# Patient Record
Sex: MALE | Race: NATIVE HAWAIIAN OR OTHER PACIFIC ISLANDER | NOT HISPANIC OR LATINO | URBAN - NONMETROPOLITAN AREA
[De-identification: names, ages, dates, MRNs, and addresses within clinical notes are randomized per-mention and may not be internally consistent; named-entity substitution may affect disease eponyms.]

---

## 2017-04-28 ENCOUNTER — APPOINTMENT (OUTPATIENT)
Age: 72
Setting detail: DERMATOLOGY
End: 2017-04-28

## 2017-04-28 DIAGNOSIS — L57.8 OTHER SKIN CHANGES DUE TO CHRONIC EXPOSURE TO NONIONIZING RADIATION: ICD-10-CM

## 2017-04-28 DIAGNOSIS — D485 NEOPLASM OF UNCERTAIN BEHAVIOR OF SKIN: ICD-10-CM

## 2017-04-28 PROBLEM — D48.5 NEOPLASM OF UNCERTAIN BEHAVIOR OF SKIN: Status: ACTIVE | Noted: 2017-04-28

## 2017-04-28 PROCEDURE — 11100: CPT

## 2017-04-28 PROCEDURE — 99214 OFFICE O/P EST MOD 30 MIN: CPT | Mod: 25

## 2017-04-28 PROCEDURE — OTHER COUNSELING: OTHER

## 2017-04-28 PROCEDURE — 11101: CPT

## 2017-04-28 PROCEDURE — OTHER MIPS QUALITY: OTHER

## 2017-04-28 PROCEDURE — OTHER BIOPSY BY SHAVE METHOD: OTHER

## 2017-04-28 ASSESSMENT — LOCATION SIMPLE DESCRIPTION DERM
LOCATION SIMPLE: LEFT CLAVICULAR SKIN
LOCATION SIMPLE: LEFT FOREHEAD
LOCATION SIMPLE: SCALP
LOCATION SIMPLE: POSTERIOR NECK

## 2017-04-28 ASSESSMENT — LOCATION DETAILED DESCRIPTION DERM
LOCATION DETAILED: LEFT CLAVICULAR SKIN
LOCATION DETAILED: RIGHT MEDIAL TRAPEZIAL NECK
LOCATION DETAILED: LEFT SUPERIOR FOREHEAD
LOCATION DETAILED: LEFT SUPERIOR PARIETAL SCALP

## 2017-04-28 ASSESSMENT — LOCATION ZONE DERM
LOCATION ZONE: NECK
LOCATION ZONE: SCALP
LOCATION ZONE: TRUNK
LOCATION ZONE: FACE

## 2017-04-28 NOTE — PROCEDURE: BIOPSY BY SHAVE METHOD
Billing Type: Third-Party Bill
Bill [id number] For Specimen Handling/Conveyance To Laboratory?: no
Wound Care: Bacitracin
Post-Care Instructions: I reviewed with the patient in detail post-care instructions. Patient is to keep the biopsy site dry overnight, and then apply bacitracin twice daily until healed. Patient may apply hydrogen peroxide soaks to remove any crusting.
Anesthesia Type: 1% lidocaine with epinephrine
Detail Level: Detailed
Silver Nitrate Text: The wound bed was treated with silver nitrate after the biopsy was performed.
Curettage Text: The wound bed was treated with curettage after the biopsy was performed.
Hemostasis: Drysol
Consent: Written consent was obtained and risks were reviewed including but not limited to scarring, infection, bleeding, scabbing, incomplete removal, nerve damage and allergy to anesthesia.
Notification Instructions: Patient will be notified of biopsy results. However, patient instructed to call the office if not contacted within 2 weeks.
Electrodesiccation Text: The wound bed was treated with electrodesiccation after the biopsy was performed.
X Size Of Lesion In Cm: 0
Electrodesiccation And Curettage Text: The wound bed was treated with electrodesiccation and curettage after the biopsy was performed.
Biopsy Type: H and E
Cryotherapy Text: The wound bed was treated with cryotherapy after the biopsy was performed.
Anesthesia Volume In Cc: 0.5
Biopsy Method: Double edge Personna blades
Dressing: bandage
Type Of Destruction Used: Curettage

## 2017-04-28 NOTE — PROCEDURE: MIPS QUALITY
Detail Level: Detailed
Quality 110: Preventive Care And Screening: Influenza Immunization: Influenza Immunization Administered during Influenza season
Quality 111:Pneumonia Vaccination Status For Older Adults: Pneumococcal Vaccination Previously Received
Quality 47: Advance Care Plan: Advance Care Planning discussed and documented; advance care plan or surrogate decision maker documented in the medical record.

## 2018-08-08 ENCOUNTER — APPOINTMENT (OUTPATIENT)
Age: 73
Setting detail: DERMATOLOGY
End: 2018-08-08

## 2018-08-08 DIAGNOSIS — D485 NEOPLASM OF UNCERTAIN BEHAVIOR OF SKIN: ICD-10-CM

## 2018-08-08 DIAGNOSIS — L57.8 OTHER SKIN CHANGES DUE TO CHRONIC EXPOSURE TO NONIONIZING RADIATION: ICD-10-CM

## 2018-08-08 DIAGNOSIS — Z85.828 PERSONAL HISTORY OF OTHER MALIGNANT NEOPLASM OF SKIN: ICD-10-CM

## 2018-08-08 DIAGNOSIS — L82.1 OTHER SEBORRHEIC KERATOSIS: ICD-10-CM

## 2018-08-08 DIAGNOSIS — B07.8 OTHER VIRAL WARTS: ICD-10-CM

## 2018-08-08 PROBLEM — M12.9 ARTHROPATHY, UNSPECIFIED: Status: ACTIVE | Noted: 2018-08-08

## 2018-08-08 PROBLEM — I10 ESSENTIAL (PRIMARY) HYPERTENSION: Status: ACTIVE | Noted: 2018-08-08

## 2018-08-08 PROBLEM — D48.5 NEOPLASM OF UNCERTAIN BEHAVIOR OF SKIN: Status: ACTIVE | Noted: 2018-08-08

## 2018-08-08 PROBLEM — E78.5 HYPERLIPIDEMIA, UNSPECIFIED: Status: ACTIVE | Noted: 2018-08-08

## 2018-08-08 PROCEDURE — 99213 OFFICE O/P EST LOW 20 MIN: CPT | Mod: 25

## 2018-08-08 PROCEDURE — 17110 DESTRUCT B9 LESION 1-14: CPT

## 2018-08-08 PROCEDURE — OTHER COUNSELING: OTHER

## 2018-08-08 PROCEDURE — 11101: CPT

## 2018-08-08 PROCEDURE — OTHER FOLLOW UP FOR NEXT VISIT: OTHER

## 2018-08-08 PROCEDURE — 11100: CPT | Mod: 59

## 2018-08-08 PROCEDURE — OTHER BIOPSY BY SHAVE METHOD: OTHER

## 2018-08-08 PROCEDURE — OTHER LIQUID NITROGEN: OTHER

## 2018-08-08 ASSESSMENT — LOCATION DETAILED DESCRIPTION DERM
LOCATION DETAILED: LEFT MEDIAL FRONTAL SCALP
LOCATION DETAILED: LEFT CLAVICULAR SKIN
LOCATION DETAILED: LEFT INFERIOR LATERAL NECK
LOCATION DETAILED: RIGHT SUPERIOR LATERAL UPPER BACK
LOCATION DETAILED: RIGHT CLAVICULAR NECK
LOCATION DETAILED: RIGHT INFERIOR ANTERIOR NECK
LOCATION DETAILED: LEFT LATERAL UPPER BACK
LOCATION DETAILED: RIGHT SUPERIOR ANTERIOR NECK
LOCATION DETAILED: LEFT FOREHEAD
LOCATION DETAILED: STERNUM
LOCATION DETAILED: RIGHT SUPERIOR FOREHEAD
LOCATION DETAILED: LEFT CLAVICULAR NECK
LOCATION DETAILED: MID-OCCIPITAL SCALP
LOCATION DETAILED: LEFT MEDIAL SUPERIOR CHEST

## 2018-08-08 ASSESSMENT — LOCATION ZONE DERM
LOCATION ZONE: SCALP
LOCATION ZONE: TRUNK
LOCATION ZONE: NECK
LOCATION ZONE: FACE

## 2018-08-08 ASSESSMENT — LOCATION SIMPLE DESCRIPTION DERM
LOCATION SIMPLE: LEFT FOREHEAD
LOCATION SIMPLE: LEFT ANTERIOR NECK
LOCATION SIMPLE: RIGHT UPPER BACK
LOCATION SIMPLE: CHEST
LOCATION SIMPLE: LEFT CLAVICULAR SKIN
LOCATION SIMPLE: LEFT UPPER BACK
LOCATION SIMPLE: RIGHT FOREHEAD
LOCATION SIMPLE: RIGHT ANTERIOR NECK
LOCATION SIMPLE: LEFT SCALP
LOCATION SIMPLE: POSTERIOR SCALP

## 2018-08-08 NOTE — PROCEDURE: BIOPSY BY SHAVE METHOD
Render Post-Care Instructions In Note?: no
Wound Care: Bacitracin
Post-Care Instructions: I reviewed with the patient in detail post-care instructions. Patient is to keep the biopsy site dry overnight, and then apply bacitracin twice daily until healed. Patient may apply hydrogen peroxide soaks to remove any crusting.
Notification Instructions: Patient will be notified of biopsy results. However, patient instructed to call the office if not contacted within 2 weeks.
Additional Anesthesia Volume In Cc (Will Not Render If 0): 0
Was A Bandage Applied: Yes
Biopsy Method: Acu-Razor
Electrodesiccation Text: The wound bed was treated with electrodesiccation after the biopsy was performed.
Type Of Destruction Used: Curettage
Consent: Written consent was obtained and risks were reviewed including but not limited to scarring, infection, bleeding, scabbing, incomplete removal, nerve damage and allergy to anesthesia.
Size Of Lesion In Cm: 0.6
Silver Nitrate Text: The wound bed was treated with silver nitrate after the biopsy was performed.
Hemostasis: Drysol
Dressing: bandage
Billing Type: Third-Party Bill
Electrodesiccation And Curettage Text: The wound bed was treated with electrodesiccation and curettage after the biopsy was performed.
Depth Of Biopsy: dermis
Anesthesia Volume In Cc: 0.5
Cryotherapy Text: The wound bed was treated with cryotherapy after the biopsy was performed.
Biopsy Type: H and E
Curettage Text: The wound bed was treated with curettage after the biopsy was performed.
Detail Level: Detailed

## 2018-08-08 NOTE — PROCEDURE: FOLLOW UP FOR NEXT VISIT
Detail Level: Simple
Instructions (Optional): Path
Scheduled For Follow Up In (Optional): PRN
Instructions (Optional): FBSE
Scheduled For Follow Up In (Optional): 2 weeks
Scheduled For Follow Up In (Optional): 6 months

## 2018-08-08 NOTE — PROCEDURE: LIQUID NITROGEN
Consent: The patient's consent was obtained including but not limited to risks of crusting, scabbing, blistering, scarring, darker or lighter pigmentary change, recurrence, incomplete removal and infection.
Include Z78.9 (Other Specified Conditions Influencing Health Status) As An Associated Diagnosis?: No
Medical Necessity Information: It is in your best interest to select a reason for this procedure from the list below. All of these items fulfill various CMS LCD requirements except the new and changing color options.
Post-Care Instructions: I reviewed with the patient in detail post-care instructions. Patient is to wear sunprotection, and avoid picking at any of the treated lesions. Pt may apply Vaseline to crusted or scabbing areas.
Detail Level: Detailed
Medical Necessity Clause: This procedure was medically necessary because the lesions that were treated were: viral infection
Number Of Freeze-Thaw Cycles: 2 freeze-thaw cycles

## 2018-09-19 ENCOUNTER — APPOINTMENT (OUTPATIENT)
Age: 73
Setting detail: DERMATOLOGY
End: 2018-09-21

## 2018-09-19 PROBLEM — C44.42 SQUAMOUS CELL CARCINOMA OF SKIN OF SCALP AND NECK: Status: ACTIVE | Noted: 2018-09-19

## 2018-09-19 PROBLEM — C44.329 SQUAMOUS CELL CARCINOMA OF SKIN OF OTHER PARTS OF FACE: Status: ACTIVE | Noted: 2018-09-19

## 2018-09-19 PROCEDURE — 99214 OFFICE O/P EST MOD 30 MIN: CPT | Mod: 25

## 2018-09-19 PROCEDURE — 77300 RADIATION THERAPY DOSE PLAN: CPT

## 2018-09-19 PROCEDURE — 77334 RADIATION TREATMENT AID(S): CPT

## 2018-09-19 PROCEDURE — OTHER FOLLOW UP FOR NEXT VISIT: OTHER

## 2018-09-19 PROCEDURE — 77263 THER RADIOLOGY TX PLNG CPLX: CPT

## 2018-09-19 PROCEDURE — OTHER SUPERFICIAL RADIATION TREATMENT: OTHER

## 2018-09-19 PROCEDURE — 77290 THER RAD SIMULAJ FIELD CPLX: CPT

## 2018-09-19 PROCEDURE — OTHER TREATMENT REGIMEN: OTHER

## 2018-09-19 NOTE — PROCEDURE: SUPERFICIAL RADIATION TREATMENT
Treatment Margins In Cm: 0.5
Intro Statement (Will Not Render If Left Blank): The patient is undergoing superficial radiation therapy for skin cancer and presents for weekly evaluation and management.  Per protocol and as documented on the flow sheet, the patient was questioned as to subjective redness, pruritus, pain, drainage, fatigue, or any other symptoms.  Objectively, the radiation area was evaluated with regards to erythema, atrophy, scale, crusting, erosion, ulceration, edema, purpura, tenderness, warmth, drainage, and any other findings.  The plan was extensively reviewed including the dose, and dosing schedule.  The simulation and clinical setup was also reviewed as was the external and any internal shields and based on this review the appropriateness and sufficiency of treatment was determined.
Field Size (Applicator): 2.5 cm
Fractions / Week Rx 2: 2
Daily Fractionated Dose (Optional- Include Units): 381.14cGy
Render Text From Evaluation And Management Tab (Will Not Bill [id number]): No
Dose / Tx In Cgy (Optional): 381.14 cGy
Bill For Dosimetry/Render Treatment Time Calculation In Note: Yes
Simple Simulation Afterword Text Will Be Included With Simple Simulations (Indications............): The patient had a complete consultation regarding all applicable modalities for the treatment of their skin cancer and based on a variety of factors including the type of tumor, size, and location, the relevant medical history as well as local tissue factors, the functional status of the individual, the ability to perform necessary postoperative wound instructions and the need for simultaneous treatments as well as overall wound healing status, it was determined that the patient would begin radiation therapy treatment for skin cancer.  A full simulation and treatment device design was performed including the determination and formulation of appropriate simple and complex devices including lead shield of 0.762 mm thickness to form molded customized shielding to specifically correlate with the lesion size including treatment margin.  The custom lead shield is adequate to accommodate the appropriate applicator and provide adequate shielding around the treatment site.  The specific field applicator, shields, and devices both simple and complex as well as the specific patient setup is outlined below.  The patient was given a full consent for superficial radiation to both verbally and in writing and the full determination of patient's eligibility for treatment and selection is outlined on the patient eligibility and treatment selection form.  The specific superficial radiotherapy prescription was determined and was documented on the superficial radiotherapy prescription form.  A treatment calculation was also performed and documented on the treatment calculation form.  Based on the prescription, the patient was scheduled for a series of fractional treatments.
Total Dose (Optional-Please Include Units) Rx 2: 3068.0cGy
Energy (Include Units): 70Kv
Dose Per Fractionation In Cgy (Optional): 381.14
Energy (Optional-Please Include Units) Rx 2: 50Kv
Treatment Time / Fractionation (Optional- Include Units): 0.59Min
Fractions / Week Rx 4: 5
Custom Shielding Preamble Text Will Not Be Included With Simple Simulations (.......... X X Y Cm): A lead shield of 0.762 mm thickness is utilized to form a molded, custom shield with a
Total Number Of Fractions Rx 4: 15
Field Number: 3
Treatment Device Design After Initial Simulation Justification (Will Render If Bill For Treatment Devices = Yes): The patient is status post radiation simulation and is evaluated as to the use of additional devices for shielding and placement for radiation therapy.
Number Of Days Off Treatment: 1
Custom Shielding Afterword Text Will Not Be Included With Simple Simulations (X X Y Cm............): port to correlate with the lesion size, including treatment margin. The custom lead shield is adequate to accommodate the appropriate applicator and provide adequate shielding around the treatment site. Additional shielding (as noted below) is used to protect sensitive, normal tissues.
Computed Treatment Time In Min (Will Render The Same As Calculated Treatment Time If Left Blank): 0.59
Total Number Of Fractions Rx 2: 8
Assessment: Appropriate reaction
Functional Status: 1 (ambulatory, light activity)
Detail Level: Detailed
Patient Positioning: Sitting
Pathology Override (Pathology Will Render As Diagnosis Name If Left Blank): Superficially Invasive Squamous Cell Carcinoma
Treatment Time / Fractionation (Optional- Include Units) Rx 2: 0.50min
Total Dose (Optional-Please Include Units): 1905.7cGy
Shielding Size (Optional- Include Units): 2.0cm x 2.0cm
Time Dose Fractionation (Optional- Include Units If Applicable): 38
Shielding Size (Optional- Include Units) Rx 2: 2.0 x 2.0 cm
Fractionation Number: 0
Daily Fractionated Dose (Optional- Include Units) Rx 2: 383.5cGy
Time Dose Fractionation (Optional- Include Units If Applicable) Rx 2: 61
Simple Simulation Preamble Text Will Be Included With Simple Simulations (.......... Indications): Simple simulation was performed today for the following reasons:
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

## 2018-09-19 NOTE — PROCEDURE: TREATMENT REGIMEN
Plan: Per the request of Dr. Childress, patient was seen today for Superficial Radiation Therapy requiring simulation (CPT® 19637) in preparation for treatment of specific diseased site(s). Simulation is necessary to determine correct patient and treatment portal positioning, deliver safe and effective radiation therapy. Today’s setup was evaluated determining continuation of treatment with the current plan, or necessary changes as appropriate. All appropriate custom blocking and treatment parameters verified by the Radiation Therapist\\nToday’s visit is for Simulation and planning for radiation therapy.  Question were answered and concerns were address.  Patient was evaluated based on listed criteria and is a suitable candidate to begin SRT.  \\n
Detail Level: Zone

## 2018-10-29 ENCOUNTER — APPOINTMENT (OUTPATIENT)
Age: 73
Setting detail: DERMATOLOGY
End: 2018-10-29

## 2018-10-29 PROBLEM — C44.42 SQUAMOUS CELL CARCINOMA OF SKIN OF SCALP AND NECK: Status: ACTIVE | Noted: 2018-10-29

## 2018-10-29 PROBLEM — C44.329 SQUAMOUS CELL CARCINOMA OF SKIN OF OTHER PARTS OF FACE: Status: ACTIVE | Noted: 2018-10-29

## 2018-10-29 PROCEDURE — OTHER FOLLOW UP FOR NEXT VISIT: OTHER

## 2018-10-29 PROCEDURE — G6001 ECHO GUIDANCE RADIOTHERAPY: HCPCS

## 2018-10-29 PROCEDURE — OTHER SUPERFICIAL RADIATION TREATMENT: OTHER

## 2018-10-29 PROCEDURE — 77401 RADIATION TX DELIVERY SUPFC: CPT

## 2018-10-29 PROCEDURE — OTHER TREATMENT REGIMEN: OTHER

## 2018-10-29 PROCEDURE — 77280 THER RAD SIMULAJ FIELD SMPL: CPT

## 2018-10-29 NOTE — PROCEDURE: TREATMENT REGIMEN
Plan: This patient has been treated today with superficial radiation therapy for non-melanoma skin cancer.   \\n \\nWritten informed consent has been previously obtained from this patient for this treatment.  This consent is documented in the patient’s chart.  The patient gave verbal consent to continue treatment today.\\n \\nThe patient was treated with a specific radiation dose and setup as prescribed by the provider listed on this visit note.  A Radiation Therapist performed administration of radiation. The treatment parameters and cumulative dose are indicated above. \\n \\nPrior to administering the radiation, the patient underwent a verification simulation defining relevant normal and abnormal target anatomy and acquiring images and data necessary to develop optimal radiation treatment process for the patient. This process includes verification of the treatment port(s) and proper treatment positioning.  All treatment ports were photographed.  The patient’s customized lead blocking was confirmed.   Considering, superficial radiotherapy setup is a clinical setup, this requires physician and radiation therapist to clarify location interest being treated against initial images, pathology and patient anatomy. Care was taken ensuring fields treated were geometrically accurate and properly positioned using daily verification simulation.  This process is also utilized to determine if any changes are necessary.   These steps are therefore medically necessary ensuring safe and effective administration of radiation.\\n \\nA high frequency ultrasound image was acquired today for two-dimensional evaluation of the tumor volume and response to treatment, in addition to geometric accuracy of field placement. The daily field placement is separate and distinct from the initial simulation and is an important task in providing safe administration of radiation therapy. Physician evaluation of the ultrasound tumor depth will be ongoing throughout the course of treatment and is deemed medically necessary in order to ensure the efficacy of treatment. Today’s image was evaluated for determination of continuation of treatment with the current plan or with necessary changes as appropriate. Additionally, the use of visualization and targeted assessment allows the patient to be able to see their cancer(s) progress, encouraging patient to complete full course of radiotherapy. \\n \\nPer Dr. Childress, continued daily US guidance and clinical setup simulation is required for field placement, measurement of tumor depth, progress and acute effect monitoring.\\n\\nL. Central Forehead\\nUs depth is 0.83mm, Repop +++, MAXIMUS equivocal\\n\\nL. Medial Frontal Scalp\\nUS depth is 0.95mm, Repop +++, MAXIMUS equivocal\\n\\nMid- Occipital Scalp\\nUS depth is 0.8mm, Repop +++, MAXIMUS equivocal
Detail Level: Zone

## 2018-10-29 NOTE — PROCEDURE: SUPERFICIAL RADIATION TREATMENT
Number Of Days Off Treatment: 1
Fractions / Week Rx 4: 5
Total Number Of Fractions Rx 2: 8
Port Dimensions-Y Axis In Cm: 2
Patient Positioning: Sitting
Field Size (Applicator): 2.5 cm
Total Number Of Fractions Rx 4: 15
Time Dose Fractionation (Optional- Include Units If Applicable) Rx 2: 61
Patel For Simulation Without Treatment Device Design (Simple Simulation): No
Cumulative Dose In Cgy (Optional): 381.14
Pathology Override (Pathology Will Render As Diagnosis Name If Left Blank): Superficially Invasive Squamous Cell Carcinoma
Intro Statement (Will Not Render If Left Blank): The patient is undergoing superficial radiation therapy for skin cancer and presents for weekly evaluation and management.  Per protocol and as documented on the flow sheet, the patient was questioned as to subjective redness, pruritus, pain, drainage, fatigue, or any other symptoms.  Objectively, the radiation area was evaluated with regards to erythema, atrophy, scale, crusting, erosion, ulceration, edema, purpura, tenderness, warmth, drainage, and any other findings.  The plan was extensively reviewed including the dose, and dosing schedule.  The simulation and clinical setup was also reviewed as was the external and any internal shields and based on this review the appropriateness and sufficiency of treatment was determined.
Treatment Time / Fractionation (Optional- Include Units) Rx 2: 0.50min
Treatment Time In Min (Optional): 0.59
Custom Shielding Preamble Text Will Not Be Included With Simple Simulations (.......... X X Y Cm): A lead shield of 0.762 mm thickness is utilized to form a molded, custom shield with a
Total Dose (Optional-Please Include Units) Rx 2: 3068.0cGy
Simple Simulation Afterword Text Will Be Included With Simple Simulations (Indications............): The patient had a complete consultation regarding all applicable modalities for the treatment of their skin cancer and based on a variety of factors including the type of tumor, size, and location, the relevant medical history as well as local tissue factors, the functional status of the individual, the ability to perform necessary postoperative wound instructions and the need for simultaneous treatments as well as overall wound healing status, it was determined that the patient would begin radiation therapy treatment for skin cancer.  A full simulation and treatment device design was performed including the determination and formulation of appropriate simple and complex devices including lead shield of 0.762 mm thickness to form molded customized shielding to specifically correlate with the lesion size including treatment margin.  The custom lead shield is adequate to accommodate the appropriate applicator and provide adequate shielding around the treatment site.  The specific field applicator, shields, and devices both simple and complex as well as the specific patient setup is outlined below.  The patient was given a full consent for superficial radiation to both verbally and in writing and the full determination of patient's eligibility for treatment and selection is outlined on the patient eligibility and treatment selection form.  The specific superficial radiotherapy prescription was determined and was documented on the superficial radiotherapy prescription form.  A treatment calculation was also performed and documented on the treatment calculation form.  Based on the prescription, the patient was scheduled for a series of fractional treatments.
Daily Fractionated Dose (Optional- Include Units): 381.14cGy
Time Dose Fractionation (Optional- Include Units If Applicable): 38
Energy (Optional-Please Include Units): 70kV
Shielding Size (Optional- Include Units) Rx 2: 2.0 x 2.0 cm
Treatment Time / Fractionation (Optional- Include Units): 0.59Min
Treatment Device Design After Initial Simulation Justification (Will Render If Bill For Treatment Devices = Yes): The patient is status post radiation simulation and is evaluated as to the use of additional devices for shielding and placement for radiation therapy.
Daily Fractionated Dose (Optional- Include Units) Rx 2: 383.5cGy
Total Dose (Optional-Please Include Units): 1905.7cGy
Treatment Margins In Cm: 0.5
Simple Simulation Preamble Text Will Be Included With Simple Simulations (.......... Indications): Simple simulation was performed today for the following reasons:
Assessment: Appropriate reaction
Functional Status: 1 (ambulatory, light activity)
Day Of The Week Treatment Administered: Monday
Custom Shielding Afterword Text Will Not Be Included With Simple Simulations (X X Y Cm............): port to correlate with the lesion size, including treatment margin. The custom lead shield is adequate to accommodate the appropriate applicator and provide adequate shielding around the treatment site. Additional shielding (as noted below) is used to protect sensitive, normal tissues.
Energy (Optional-Please Include Units) Rx 2: 50Kv
Bill For Radiation Treatment: Yes
Detail Level: Detailed
Field Number: 3
Shielding Size (Optional- Include Units): 2.0cm x 2.0cm
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

## 2018-10-31 ENCOUNTER — APPOINTMENT (OUTPATIENT)
Age: 73
Setting detail: DERMATOLOGY
End: 2018-11-01

## 2018-10-31 PROBLEM — C44.329 SQUAMOUS CELL CARCINOMA OF SKIN OF OTHER PARTS OF FACE: Status: ACTIVE | Noted: 2018-10-31

## 2018-10-31 PROBLEM — C44.42 SQUAMOUS CELL CARCINOMA OF SKIN OF SCALP AND NECK: Status: ACTIVE | Noted: 2018-10-31

## 2018-10-31 PROCEDURE — G6001 ECHO GUIDANCE RADIOTHERAPY: HCPCS

## 2018-10-31 PROCEDURE — 77280 THER RAD SIMULAJ FIELD SMPL: CPT

## 2018-10-31 PROCEDURE — OTHER FOLLOW UP FOR NEXT VISIT: OTHER

## 2018-10-31 PROCEDURE — OTHER SUPERFICIAL RADIATION TREATMENT: OTHER

## 2018-10-31 PROCEDURE — OTHER TREATMENT REGIMEN: OTHER

## 2018-10-31 PROCEDURE — 77401 RADIATION TX DELIVERY SUPFC: CPT

## 2018-10-31 NOTE — PROCEDURE: TREATMENT REGIMEN
Plan: This patient has been treated today with superficial radiation therapy for non-melanoma skin cancer.   \\n \\nWritten informed consent has been previously obtained from this patient for this treatment.  This consent is documented in the patient’s chart.  The patient gave verbal consent to continue treatment today.\\n \\nThe patient was treated with a specific radiation dose and setup as prescribed by the provider listed on this visit note.  A Radiation Therapist performed administration of radiation. The treatment parameters and cumulative dose are indicated above. \\n \\nPrior to administering the radiation, the patient underwent a verification simulation defining relevant normal and abnormal target anatomy and acquiring images and data necessary to develop optimal radiation treatment process for the patient. This process includes verification of the treatment port(s) and proper treatment positioning.  All treatment ports were photographed.  The patient’s customized lead blocking was confirmed.   Considering, superficial radiotherapy setup is a clinical setup, this requires physician and radiation therapist to clarify location interest being treated against initial images, pathology and patient anatomy. Care was taken ensuring fields treated were geometrically accurate and properly positioned using daily verification simulation.  This process is also utilized to determine if any changes are necessary.   These steps are therefore medically necessary ensuring safe and effective administration of radiation.\\n \\nA high frequency ultrasound image was acquired today for two-dimensional evaluation of the tumor volume and response to treatment, in addition to geometric accuracy of field placement. The daily field placement is separate and distinct from the initial simulation and is an important task in providing safe administration of radiation therapy. Physician evaluation of the ultrasound tumor depth will be ongoing throughout the course of treatment and is deemed medically necessary in order to ensure the efficacy of treatment. Today’s image was evaluated for determination of continuation of treatment with the current plan or with necessary changes as appropriate. Additionally, the use of visualization and targeted assessment allows the patient to be able to see their cancer(s) progress, encouraging patient to complete full course of radiotherapy. \\n \\nPer Dr. Childress, continued daily US guidance and clinical setup simulation is required for field placement, measurement of tumor depth, progress and acute effect monitoring.\\n\\nL. Central Forehead\\nUs depth is 0.65mm, Repop +++, MAXIMUS equivocal\\n\\nL. Medial Frontal Scalp\\nUS depth is 1.09mm, Repop +++, MAXIMUS equivocal\\n\\nMid- Occipital Scalp\\nUS depth is 0.77mm, Repop +++, MAXIMUS equivocal
Detail Level: Zone

## 2018-10-31 NOTE — PROCEDURE: SUPERFICIAL RADIATION TREATMENT
Computed Treatment Time In Min (Will Render The Same As Calculated Treatment Time If Left Blank): 0.59
Bill For Treatment Devices Only: No
Fractionation Number (Evaluation): 5
Energy (Optional-Please Include Units): 70kV
Shielding Size (Optional- Include Units): 2.0cm x 2.0cm
Dose Per Fractionation In Cgy (Optional): 381.14
Total Number Of Fractions Rx 3: 15
Simple Simulation Preamble Text Will Be Included With Simple Simulations (.......... Indications): Simple simulation was performed today for the following reasons:
Treatment Margins In Cm: 0.5
Field Number: 1
Treatment Time / Fractionation (Optional- Include Units): 0.59Min
Custom Shielding Preamble Text Will Not Be Included With Simple Simulations (.......... X X Y Cm): A lead shield of 0.762 mm thickness is utilized to form a molded, custom shield with a
Field Size (Applicator): 2.5 cm
Initial Radiation Treatment Planning (Will Render If Bill Simulation = Yes): The patient had a complete consultation regarding all applicable modalities for the treatment of their skin cancer and based on a variety of factors including the type of tumor, size, and location, the relevant medical history as well as local tissue factors, the functional status of the individual, the ability to perform necessary postoperative wound instructions and the need for simultaneous treatments as well as overall wound healing status, it was determined that the patient would begin radiation therapy treatment for skin cancer.  A full simulation and treatment device design was performed including the determination and formulation of appropriate simple and complex devices including lead shield of 0.762 mm thickness to form molded customized shielding to specifically correlate with the lesion size including treatment margin.  The custom lead shield is adequate to accommodate the appropriate applicator and provide adequate shielding around the treatment site.  The specific field applicator, shields, and devices both simple and complex as well as the specific patient setup is outlined below.  The patient was given a full consent for superficial radiation to both verbally and in writing and the full determination of patient's eligibility for treatment and selection is outlined on the patient eligibility and treatment selection form.  The specific superficial radiotherapy prescription was determined and was documented on the superficial radiotherapy prescription form.  A treatment calculation was also performed and documented on the treatment calculation form.  Based on the prescription, the patient was scheduled for a series of fractional treatments.
Day Of The Week Treatment Administered: Wednesday
Total Dose (Optional-Please Include Units) Rx 2: 3068.0cGy
Patient Positioning: Sitting
Shielding Size (Optional- Include Units) Rx 2: 2.0 x 2.0 cm
Include Rx 2 When Rendering Additional Prescriptions: Yes
Pathology Override (Pathology Will Render As Diagnosis Name If Left Blank): Superficially Invasive Squamous Cell Carcinoma
Intro Statement (Will Not Render If Left Blank): The patient is undergoing superficial radiation therapy for skin cancer and presents for weekly evaluation and management.  Per protocol and as documented on the flow sheet, the patient was questioned as to subjective redness, pruritus, pain, drainage, fatigue, or any other symptoms.  Objectively, the radiation area was evaluated with regards to erythema, atrophy, scale, crusting, erosion, ulceration, edema, purpura, tenderness, warmth, drainage, and any other findings.  The plan was extensively reviewed including the dose, and dosing schedule.  The simulation and clinical setup was also reviewed as was the external and any internal shields and based on this review the appropriateness and sufficiency of treatment was determined.
Port Dimensions-X Axis In Cm: 2
Treatment Time / Fractionation (Optional- Include Units) Rx 2: 0.50min
Cumulative Dose In Cgy (Optional): 762.28
Assessment: Appropriate reaction
Total Number Of Fractions Rx 2: 8
Time Dose Fractionation (Optional- Include Units If Applicable): 38
Functional Status: 1 (ambulatory, light activity)
Time Dose Fractionation (Optional- Include Units If Applicable) Rx 2: 61
Daily Fractionated Dose (Optional- Include Units): 381.14cGy
Energy (Optional-Please Include Units) Rx 2: 50Kv
Custom Shielding Afterword Text Will Not Be Included With Simple Simulations (X X Y Cm............): port to correlate with the lesion size, including treatment margin. The custom lead shield is adequate to accommodate the appropriate applicator and provide adequate shielding around the treatment site. Additional shielding (as noted below) is used to protect sensitive, normal tissues.
Detail Level: Detailed
Treatment Device Design After Initial Simulation Justification (Will Render If Bill For Treatment Devices = Yes): The patient is status post radiation simulation and is evaluated as to the use of additional devices for shielding and placement for radiation therapy.
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
Total Dose (Optional-Please Include Units): 1905.7cGy
Daily Fractionated Dose (Optional- Include Units) Rx 2: 383.5cGy
Field Number: 3

## 2018-11-05 ENCOUNTER — APPOINTMENT (OUTPATIENT)
Age: 73
Setting detail: DERMATOLOGY
End: 2018-11-05

## 2018-11-05 PROBLEM — C44.329 SQUAMOUS CELL CARCINOMA OF SKIN OF OTHER PARTS OF FACE: Status: ACTIVE | Noted: 2018-11-05

## 2018-11-05 PROBLEM — C44.42 SQUAMOUS CELL CARCINOMA OF SKIN OF SCALP AND NECK: Status: ACTIVE | Noted: 2018-11-05

## 2018-11-05 PROBLEM — Z85.828 PERSONAL HISTORY OF OTHER MALIGNANT NEOPLASM OF SKIN: Status: ACTIVE | Noted: 2018-11-05

## 2018-11-05 PROCEDURE — OTHER SUPERFICIAL RADIATION TREATMENT: OTHER

## 2018-11-05 PROCEDURE — G6001 ECHO GUIDANCE RADIOTHERAPY: HCPCS

## 2018-11-05 PROCEDURE — OTHER FOLLOW UP FOR NEXT VISIT: OTHER

## 2018-11-05 PROCEDURE — OTHER TREATMENT REGIMEN: OTHER

## 2018-11-05 PROCEDURE — 77280 THER RAD SIMULAJ FIELD SMPL: CPT

## 2018-11-05 PROCEDURE — 77401 RADIATION TX DELIVERY SUPFC: CPT

## 2018-11-05 NOTE — PROCEDURE: TREATMENT REGIMEN
Plan: This patient has been treated today with superficial radiation therapy for non-melanoma skin cancer.   \\n \\nWritten informed consent has been previously obtained from this patient for this treatment.  This consent is documented in the patient’s chart.  The patient gave verbal consent to continue treatment today.\\n \\nThe patient was treated with a specific radiation dose and setup as prescribed by the provider listed on this visit note.  A Radiation Therapist performed administration of radiation. The treatment parameters and cumulative dose are indicated above. \\n \\nPrior to administering the radiation, the patient underwent a verification simulation defining relevant normal and abnormal target anatomy and acquiring images and data necessary to develop optimal radiation treatment process for the patient. This process includes verification of the treatment port(s) and proper treatment positioning.  All treatment ports were photographed.  The patient’s customized lead blocking was confirmed.   Considering, superficial radiotherapy setup is a clinical setup, this requires physician and radiation therapist to clarify location interest being treated against initial images, pathology and patient anatomy. Care was taken ensuring fields treated were geometrically accurate and properly positioned using daily verification simulation.  This process is also utilized to determine if any changes are necessary.   These steps are therefore medically necessary ensuring safe and effective administration of radiation.\\n \\nA high frequency ultrasound image was acquired today for two-dimensional evaluation of the tumor volume and response to treatment, in addition to geometric accuracy of field placement. The daily field placement is separate and distinct from the initial simulation and is an important task in providing safe administration of radiation therapy. Physician evaluation of the ultrasound tumor depth will be ongoing throughout the course of treatment and is deemed medically necessary in order to ensure the efficacy of treatment. Today’s image was evaluated for determination of continuation of treatment with the current plan or with necessary changes as appropriate. Additionally, the use of visualization and targeted assessment allows the patient to be able to see their cancer(s) progress, encouraging patient to complete full course of radiotherapy. \\n \\nPer Dr. Childress, continued daily US guidance and clinical setup simulation is required for field placement, measurement of tumor depth, progress and acute effect monitoring.\\n\\nL. Central Forehead\\nUs depth is 0.0mm, Repop +++, MAXIMUS 0.0 mm sq\\n\\nL. Medial Frontal Scalp\\nUS depth is 1.15mm, Repop +++, MAXIMUS equivocal\\n\\nMid- Occipital Scalp\\nUS depth is 0.64mm, Repop +++, MAXIMUS equivocal
Detail Level: Zone

## 2018-11-05 NOTE — PROCEDURE: SUPERFICIAL RADIATION TREATMENT
Field Size (Applicator): 2.5 cm
Time Dose Fractionation (Optional- Include Units If Applicable): 38
Port Dimensions-X Axis In Cm: 2
Fractionation Number: 3
Total Dose (Optional-Please Include Units) Rx 2: 3068.0cGy
Treatment Time / Fractionation (Optional- Include Units): 0.59Min
Treatment Time / Fractionation (Optional- Include Units) Rx 2: 0.50min
Shielding Size (Optional- Include Units) Rx 2: 2.0 x 2.0 cm
Simple Simulation Preamble Text Will Be Included With Simple Simulations (.......... Indications): Simple simulation was performed today for the following reasons:
Render Text From Evaluation And Management Tab (Will Not Bill 12701): No
Treatment Device Design After Initial Simulation Justification (Will Render If Bill For Treatment Devices = Yes): The patient is status post radiation simulation and is evaluated as to the use of additional devices for shielding and placement for radiation therapy.
Energy (Optional-Please Include Units): 70kV
Dose / Tx In Cgy (Optional): 381.14
Total Number Of Fractions Rx 2: 8
Initial Radiation Treatment Planning (Will Render If Bill Simulation = Yes): The patient had a complete consultation regarding all applicable modalities for the treatment of their skin cancer and based on a variety of factors including the type of tumor, size, and location, the relevant medical history as well as local tissue factors, the functional status of the individual, the ability to perform necessary postoperative wound instructions and the need for simultaneous treatments as well as overall wound healing status, it was determined that the patient would begin radiation therapy treatment for skin cancer.  A full simulation and treatment device design was performed including the determination and formulation of appropriate simple and complex devices including lead shield of 0.762 mm thickness to form molded customized shielding to specifically correlate with the lesion size including treatment margin.  The custom lead shield is adequate to accommodate the appropriate applicator and provide adequate shielding around the treatment site.  The specific field applicator, shields, and devices both simple and complex as well as the specific patient setup is outlined below.  The patient was given a full consent for superficial radiation to both verbally and in writing and the full determination of patient's eligibility for treatment and selection is outlined on the patient eligibility and treatment selection form.  The specific superficial radiotherapy prescription was determined and was documented on the superficial radiotherapy prescription form.  A treatment calculation was also performed and documented on the treatment calculation form.  Based on the prescription, the patient was scheduled for a series of fractional treatments.
Patient Positioning: Sitting
Fractions / Week Rx 4: 5
Intro Statement (Will Not Render If Left Blank): The patient is undergoing superficial radiation therapy for skin cancer and presents for weekly evaluation and management.  Per protocol and as documented on the flow sheet, the patient was questioned as to subjective redness, pruritus, pain, drainage, fatigue, or any other symptoms.  Objectively, the radiation area was evaluated with regards to erythema, atrophy, scale, crusting, erosion, ulceration, edema, purpura, tenderness, warmth, drainage, and any other findings.  The plan was extensively reviewed including the dose, and dosing schedule.  The simulation and clinical setup was also reviewed as was the external and any internal shields and based on this review the appropriateness and sufficiency of treatment was determined.
Number Of Treatment Days: 1
Functional Status: 1 (ambulatory, light activity)
Assessment: Appropriate reaction
Custom Shielding Preamble Text Will Not Be Included With Simple Simulations (.......... X X Y Cm): A lead shield of 0.762 mm thickness is utilized to form a molded, custom shield with a
Treatment Time In Min (Optional): 0.59
Include Rx 2 When Rendering Additional Prescriptions: Yes
Cumulative Dose In Cgy (Optional): 1143.42
Shielding Size (Optional- Include Units): 2.0cm x 2.0cm
Detail Level: Detailed
Day Of The Week Treatment Administered: Monday
Total Number Of Fractions Rx 3: 15
Energy (Optional-Please Include Units) Rx 2: 50Kv
Custom Shielding Afterword Text Will Not Be Included With Simple Simulations (X X Y Cm............): port to correlate with the lesion size, including treatment margin. The custom lead shield is adequate to accommodate the appropriate applicator and provide adequate shielding around the treatment site. Additional shielding (as noted below) is used to protect sensitive, normal tissues.
Daily Fractionated Dose (Optional- Include Units) Rx 2: 383.5cGy
Daily Fractionated Dose (Optional- Include Units): 381.14cGy
Total Dose (Optional-Please Include Units): 1905.7cGy
Time Dose Fractionation (Optional- Include Units If Applicable) Rx 2: 61
Treatment Margins In Cm: 0.5
Pathology Override (Pathology Will Render As Diagnosis Name If Left Blank): Superficially Invasive Squamous Cell Carcinoma
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

## 2018-11-07 ENCOUNTER — APPOINTMENT (OUTPATIENT)
Age: 73
Setting detail: DERMATOLOGY
End: 2018-11-12

## 2018-11-07 PROBLEM — C44.42 SQUAMOUS CELL CARCINOMA OF SKIN OF SCALP AND NECK: Status: ACTIVE | Noted: 2018-11-07

## 2018-11-07 PROBLEM — C44.329 SQUAMOUS CELL CARCINOMA OF SKIN OF OTHER PARTS OF FACE: Status: ACTIVE | Noted: 2018-11-07

## 2018-11-07 PROCEDURE — 77280 THER RAD SIMULAJ FIELD SMPL: CPT

## 2018-11-07 PROCEDURE — OTHER FOLLOW UP FOR NEXT VISIT: OTHER

## 2018-11-07 PROCEDURE — OTHER SUPERFICIAL RADIATION TREATMENT: OTHER

## 2018-11-07 PROCEDURE — OTHER TREATMENT REGIMEN: OTHER

## 2018-11-07 PROCEDURE — 77401 RADIATION TX DELIVERY SUPFC: CPT

## 2018-11-07 PROCEDURE — G6001 ECHO GUIDANCE RADIOTHERAPY: HCPCS

## 2018-11-07 NOTE — PROCEDURE: SUPERFICIAL RADIATION TREATMENT
Energy (Optional-Please Include Units) Rx 2: 50Kv
Total Dose (Optional-Please Include Units): 1905.7cGy
Bill For Treatment Devices Only: No
Port Dimensions-X Axis In Cm: 2
Day Of The Week Treatment Administered: Wednesday
Simple Simulation Afterword Text Will Be Included With Simple Simulations (Indications............): The patient had a complete consultation regarding all applicable modalities for the treatment of their skin cancer and based on a variety of factors including the type of tumor, size, and location, the relevant medical history as well as local tissue factors, the functional status of the individual, the ability to perform necessary postoperative wound instructions and the need for simultaneous treatments as well as overall wound healing status, it was determined that the patient would begin radiation therapy treatment for skin cancer.  A full simulation and treatment device design was performed including the determination and formulation of appropriate simple and complex devices including lead shield of 0.762 mm thickness to form molded customized shielding to specifically correlate with the lesion size including treatment margin.  The custom lead shield is adequate to accommodate the appropriate applicator and provide adequate shielding around the treatment site.  The specific field applicator, shields, and devices both simple and complex as well as the specific patient setup is outlined below.  The patient was given a full consent for superficial radiation to both verbally and in writing and the full determination of patient's eligibility for treatment and selection is outlined on the patient eligibility and treatment selection form.  The specific superficial radiotherapy prescription was determined and was documented on the superficial radiotherapy prescription form.  A treatment calculation was also performed and documented on the treatment calculation form.  Based on the prescription, the patient was scheduled for a series of fractional treatments.
Include Rx 2 When Rendering Additional Prescriptions: Yes
Dose Per Fractionation In Cgy (Optional): 381.14
Simple Simulation Preamble Text Will Be Included With Simple Simulations (.......... Indications): Simple simulation was performed today for the following reasons:
Treatment Time / Fractionation (Optional- Include Units): 0.59Min
Custom Shielding Preamble Text Will Not Be Included With Simple Simulations (.......... X X Y Cm): A lead shield of 0.762 mm thickness is utilized to form a molded, custom shield with a
Treatment Time / Fractionation (Optional- Include Units) Rx 2: 0.50min
Field Size (Applicator): 2.5 cm
Total Number Of Fractions Rx 4: 15
Daily Fractionated Dose (Optional- Include Units): 381.14cGy
Functional Status: 1 (ambulatory, light activity)
Fractions / Week Rx 4: 5
Treatment Margins In Cm: 0.5
Number Of Treatment Days: 1
Assessment: Appropriate reaction
Custom Shielding Afterword Text Will Not Be Included With Simple Simulations (X X Y Cm............): port to correlate with the lesion size, including treatment margin. The custom lead shield is adequate to accommodate the appropriate applicator and provide adequate shielding around the treatment site. Additional shielding (as noted below) is used to protect sensitive, normal tissues.
Total Dose (Optional-Please Include Units) Rx 2: 3068.0cGy
Treatment Device Design After Initial Simulation Justification (Will Render If Bill For Treatment Devices = Yes): The patient is status post radiation simulation and is evaluated as to the use of additional devices for shielding and placement for radiation therapy.
Shielding Size (Optional- Include Units) Rx 2: 2.0 x 2.0 cm
Energy (Optional-Please Include Units): 70kV
Daily Fractionated Dose (Optional- Include Units) Rx 2: 383.5cGy
Total Number Of Fractions Rx 2: 8
Time Dose Fractionation (Optional- Include Units If Applicable): 38
Computed Treatment Time In Min (Will Render The Same As Calculated Treatment Time If Left Blank): 0.59
Patient Positioning: Sitting
Fractionation Number: 4
Cumulative Dose In Cgy (Optional): 1524.56
Time Dose Fractionation (Optional- Include Units If Applicable) Rx 2: 61
Detail Level: Detailed
Field Number: 3
Shielding Size (Optional- Include Units): 2.0cm x 2.0cm
Intro Statement (Will Not Render If Left Blank): The patient is undergoing superficial radiation therapy for skin cancer and presents for weekly evaluation and management.  Per protocol and as documented on the flow sheet, the patient was questioned as to subjective redness, pruritus, pain, drainage, fatigue, or any other symptoms.  Objectively, the radiation area was evaluated with regards to erythema, atrophy, scale, crusting, erosion, ulceration, edema, purpura, tenderness, warmth, drainage, and any other findings.  The plan was extensively reviewed including the dose, and dosing schedule.  The simulation and clinical setup was also reviewed as was the external and any internal shields and based on this review the appropriateness and sufficiency of treatment was determined.
Pathology Override (Pathology Will Render As Diagnosis Name If Left Blank): Superficially Invasive Squamous Cell Carcinoma
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

## 2018-11-07 NOTE — PROCEDURE: TREATMENT REGIMEN
Plan: This patient has been treated today with superficial radiation therapy for non-melanoma skin cancer.   \\n \\nWritten informed consent has been previously obtained from this patient for this treatment.  This consent is documented in the patient’s chart.  The patient gave verbal consent to continue treatment today.\\n \\nThe patient was treated with a specific radiation dose and setup as prescribed by the provider listed on this visit note.  A Radiation Therapist performed administration of radiation. The treatment parameters and cumulative dose are indicated above. \\n \\nPrior to administering the radiation, the patient underwent a verification simulation defining relevant normal and abnormal target anatomy and acquiring images and data necessary to develop optimal radiation treatment process for the patient. This process includes verification of the treatment port(s) and proper treatment positioning.  All treatment ports were photographed.  The patient’s customized lead blocking was confirmed.   Considering, superficial radiotherapy setup is a clinical setup, this requires physician and radiation therapist to clarify location interest being treated against initial images, pathology and patient anatomy. Care was taken ensuring fields treated were geometrically accurate and properly positioned using daily verification simulation.  This process is also utilized to determine if any changes are necessary.   These steps are therefore medically necessary ensuring safe and effective administration of radiation.\\n \\nA high frequency ultrasound image was acquired today for two-dimensional evaluation of the tumor volume and response to treatment, in addition to geometric accuracy of field placement. The daily field placement is separate and distinct from the initial simulation and is an important task in providing safe administration of radiation therapy. Physician evaluation of the ultrasound tumor depth will be ongoing throughout the course of treatment and is deemed medically necessary in order to ensure the efficacy of treatment. Today’s image was evaluated for determination of continuation of treatment with the current plan or with necessary changes as appropriate. Additionally, the use of visualization and targeted assessment allows the patient to be able to see their cancer(s) progress, encouraging patient to complete full course of radiotherapy. \\n \\nPer Dr. Childress, continued daily US guidance and clinical setup simulation is required for field placement, measurement of tumor depth, progress and acute effect monitoring.\\n\\nL. Central Forehead\\nUs depth is 1.28mm, Repop +++, MAXIMUS equivocal\\n\\nL. Medial Frontal Scalp\\nUS depth is 1.21mm, Repop +++, MAXIMUS equivocal\\n\\nMid- Occipital Scalp\\nUS depth is 0.72mm, Repop +++, MAXIMUS equivocal
Detail Level: Zone

## 2018-11-12 ENCOUNTER — APPOINTMENT (OUTPATIENT)
Age: 73
Setting detail: DERMATOLOGY
End: 2018-11-12

## 2018-11-12 PROBLEM — C44.329 SQUAMOUS CELL CARCINOMA OF SKIN OF OTHER PARTS OF FACE: Status: ACTIVE | Noted: 2018-11-12

## 2018-11-12 PROBLEM — C44.42 SQUAMOUS CELL CARCINOMA OF SKIN OF SCALP AND NECK: Status: ACTIVE | Noted: 2018-11-12

## 2018-11-12 PROCEDURE — OTHER SUPERFICIAL RADIATION TREATMENT: OTHER

## 2018-11-12 PROCEDURE — OTHER FOLLOW UP FOR NEXT VISIT: OTHER

## 2018-11-12 PROCEDURE — 77280 THER RAD SIMULAJ FIELD SMPL: CPT

## 2018-11-12 PROCEDURE — 77401 RADIATION TX DELIVERY SUPFC: CPT

## 2018-11-12 PROCEDURE — G6001 ECHO GUIDANCE RADIOTHERAPY: HCPCS

## 2018-11-12 PROCEDURE — OTHER TREATMENT REGIMEN: OTHER

## 2018-11-12 PROCEDURE — 99212 OFFICE O/P EST SF 10 MIN: CPT | Mod: 25

## 2018-11-12 NOTE — PROCEDURE: SUPERFICIAL RADIATION TREATMENT
Treatment Time In Min (Optional): 0.59
Patel For Simulation Without Treatment Device Design (Simple Simulation): No
Total Number Of Fractions Rx 2: 8
Energy (Optional-Please Include Units) Rx 2: 50Kv
Fractions / Week: 2
Energy (Optional-Please Include Units): 70kV
Render Text From Evaluation And Management Tab (Will Not Bill [id number]): Yes
Simple Simulation Afterword Text Will Be Included With Simple Simulations (Indications............): The patient had a complete consultation regarding all applicable modalities for the treatment of their skin cancer and based on a variety of factors including the type of tumor, size, and location, the relevant medical history as well as local tissue factors, the functional status of the individual, the ability to perform necessary postoperative wound instructions and the need for simultaneous treatments as well as overall wound healing status, it was determined that the patient would begin radiation therapy treatment for skin cancer.  A full simulation and treatment device design was performed including the determination and formulation of appropriate simple and complex devices including lead shield of 0.762 mm thickness to form molded customized shielding to specifically correlate with the lesion size including treatment margin.  The custom lead shield is adequate to accommodate the appropriate applicator and provide adequate shielding around the treatment site.  The specific field applicator, shields, and devices both simple and complex as well as the specific patient setup is outlined below.  The patient was given a full consent for superficial radiation to both verbally and in writing and the full determination of patient's eligibility for treatment and selection is outlined on the patient eligibility and treatment selection form.  The specific superficial radiotherapy prescription was determined and was documented on the superficial radiotherapy prescription form.  A treatment calculation was also performed and documented on the treatment calculation form.  Based on the prescription, the patient was scheduled for a series of fractional treatments.
Shielding Size (Optional- Include Units): 2.0cm x 2.0cm
Daily Fractionated Dose (Optional- Include Units) Rx 2: 383.5cGy
Treatment Device Design After Initial Simulation Justification (Will Render If Bill For Treatment Devices = Yes): The patient is status post radiation simulation and is evaluated as to the use of additional devices for shielding and placement for radiation therapy.
Dose Per Fractionation In Cgy (Optional): 383.5
Prescription Used: 1
Comments: On [employer], RTOG 1, Decrease in energy from 70Kv to 50Kv per prescription protocol
Simple Simulation Preamble Text Will Be Included With Simple Simulations (.......... Indications): Simple simulation was performed today for the following reasons:
Dose / Tx In Cgy (Optional): 381.14
Field Size (Applicator): 2.5 cm
Shielding Size (Optional- Include Units) Rx 2: 2.0 x 2.0 cm
Functional Status: 1 (ambulatory, light activity)
Total Dose (Optional-Please Include Units): 1905.7cGy
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
Total Number Of Fractions: 5
Computed Treatment Time In Min (Will Render The Same As Calculated Treatment Time If Left Blank): 0.50
Custom Shielding Afterword Text Will Not Be Included With Simple Simulations (X X Y Cm............): port to correlate with the lesion size, including treatment margin. The custom lead shield is adequate to accommodate the appropriate applicator and provide adequate shielding around the treatment site. Additional shielding (as noted below) is used to protect sensitive, normal tissues.
Detail Level: Detailed
Time Dose Fractionation (Optional- Include Units If Applicable): 38
Total Number Of Fractions Rx 3: 15
Cumulative Dose In Cgy (Optional): 1905.7
Treatment Margins In Cm: 0.5
Patient Positioning: Sitting
Field Number: 3
Custom Shielding Preamble Text Will Not Be Included With Simple Simulations (.......... X X Y Cm): A lead shield of 0.762 mm thickness is utilized to form a molded, custom shield with a
Day Of The Week Treatment Administered: Monday
Intro Statement (Will Not Render If Left Blank): The patient is undergoing superficial radiation therapy for skin cancer and presents for weekly evaluation and management.  Per protocol and as documented on the flow sheet, the patient was questioned as to subjective redness, pruritus, pain, drainage, fatigue, or any other symptoms.  Objectively, the radiation area was evaluated with regards to erythema, atrophy, scale, crusting, erosion, ulceration, edema, purpura, tenderness, warmth, drainage, and any other findings.  The plan was extensively reviewed including the dose, and dosing schedule.  The simulation and clinical setup was also reviewed as was the external and any internal shields and based on this review the appropriateness and sufficiency of treatment was determined.
Total Dose (Optional-Please Include Units) Rx 2: 3068.0cGy
Treatment Time / Fractionation (Optional- Include Units) Rx 2: 0.50min
Daily Fractionated Dose (Optional- Include Units): 381.14cGy
Time Dose Fractionation (Optional- Include Units If Applicable) Rx 2: 61
Pathology Override (Pathology Will Render As Diagnosis Name If Left Blank): Superficially Invasive Squamous Cell Carcinoma
Treatment Time / Fractionation (Optional- Include Units): 0.59Min
Assessment: Appropriate reaction

## 2018-11-14 ENCOUNTER — APPOINTMENT (OUTPATIENT)
Age: 73
Setting detail: DERMATOLOGY
End: 2018-11-14

## 2018-11-14 PROBLEM — C44.329 SQUAMOUS CELL CARCINOMA OF SKIN OF OTHER PARTS OF FACE: Status: ACTIVE | Noted: 2018-11-14

## 2018-11-14 PROBLEM — I10 ESSENTIAL (PRIMARY) HYPERTENSION: Status: ACTIVE | Noted: 2018-11-14

## 2018-11-14 PROBLEM — Z85.828 PERSONAL HISTORY OF OTHER MALIGNANT NEOPLASM OF SKIN: Status: ACTIVE | Noted: 2018-11-14

## 2018-11-14 PROBLEM — C44.42 SQUAMOUS CELL CARCINOMA OF SKIN OF SCALP AND NECK: Status: ACTIVE | Noted: 2018-11-14

## 2018-11-14 PROCEDURE — OTHER FOLLOW UP FOR NEXT VISIT: OTHER

## 2018-11-14 PROCEDURE — OTHER SUPERFICIAL RADIATION TREATMENT: OTHER

## 2018-11-14 PROCEDURE — OTHER TREATMENT REGIMEN: OTHER

## 2018-11-14 PROCEDURE — G6001 ECHO GUIDANCE RADIOTHERAPY: HCPCS

## 2018-11-14 PROCEDURE — 77280 THER RAD SIMULAJ FIELD SMPL: CPT

## 2018-11-14 PROCEDURE — 77401 RADIATION TX DELIVERY SUPFC: CPT

## 2018-11-14 PROCEDURE — 77300 RADIATION THERAPY DOSE PLAN: CPT

## 2018-11-14 NOTE — PROCEDURE: SUPERFICIAL RADIATION TREATMENT
Simple Simulation Preamble Text Will Be Included With Simple Simulations (.......... Indications): Simple simulation was performed today for the following reasons:
Dimensions-Y Axis In Cm: 1
Treatment Margins In Cm: 0.5
Total Number Of Fractions Rx 3: 15
Fractions / Week Rx 3: 5
Time Dose Fractionation (Optional- Include Units If Applicable) Rx 2: 61
Pathology Override (Pathology Will Render As Diagnosis Name If Left Blank): Superficially Invasive Squamous Cell Carcinoma
Treatment Time In Min (Optional): 0.50
Treatment Time / Fractionation (Optional- Include Units): 0.59Min
Render Additional Prescriptions In Note?: No
Day Of The Week Treatment Administered: Wednesday
Energy (Include Units): 50kV
Time Dose Fractionation (Optional- Include Units If Applicable): 38
Include Rx 2 When Rendering Additional Prescriptions: Yes
Port Dimensions-Y Axis In Cm: 2
Total Dose (Optional-Please Include Units): 1905.7cGy
Total Dose (Optional-Please Include Units) Rx 2: 3068.0cGy
Shielding Size (Optional- Include Units): 2.0cm x 2.0cm
Detail Level: Detailed
Fractionation Number: 6
Shielding Size (Optional- Include Units) Rx 2: 2.0 x 2.0 cm
Daily Fractionated Dose (Optional- Include Units) Rx 2: 383.5cGy
Dose / Tx In Cgy (Optional): 383.5
Intro Statement (Will Not Render If Left Blank): The patient is undergoing superficial radiation therapy for skin cancer and presents for weekly evaluation and management.  Per protocol and as documented on the flow sheet, the patient was questioned as to subjective redness, pruritus, pain, drainage, fatigue, or any other symptoms.  Objectively, the radiation area was evaluated with regards to erythema, atrophy, scale, crusting, erosion, ulceration, edema, purpura, tenderness, warmth, drainage, and any other findings.  The plan was extensively reviewed including the dose, and dosing schedule.  The simulation and clinical setup was also reviewed as was the external and any internal shields and based on this review the appropriateness and sufficiency of treatment was determined.
Patient Positioning: Sitting
Initial Radiation Treatment Planning (Will Render If Bill Simulation = Yes): The patient had a complete consultation regarding all applicable modalities for the treatment of their skin cancer and based on a variety of factors including the type of tumor, size, and location, the relevant medical history as well as local tissue factors, the functional status of the individual, the ability to perform necessary postoperative wound instructions and the need for simultaneous treatments as well as overall wound healing status, it was determined that the patient would begin radiation therapy treatment for skin cancer.  A full simulation and treatment device design was performed including the determination and formulation of appropriate simple and complex devices including lead shield of 0.762 mm thickness to form molded customized shielding to specifically correlate with the lesion size including treatment margin.  The custom lead shield is adequate to accommodate the appropriate applicator and provide adequate shielding around the treatment site.  The specific field applicator, shields, and devices both simple and complex as well as the specific patient setup is outlined below.  The patient was given a full consent for superficial radiation to both verbally and in writing and the full determination of patient's eligibility for treatment and selection is outlined on the patient eligibility and treatment selection form.  The specific superficial radiotherapy prescription was determined and was documented on the superficial radiotherapy prescription form.  A treatment calculation was also performed and documented on the treatment calculation form.  Based on the prescription, the patient was scheduled for a series of fractional treatments.
Treatment Device Design After Initial Simulation Justification (Will Render If Bill For Treatment Devices = Yes): The patient is status post radiation simulation and is evaluated as to the use of additional devices for shielding and placement for radiation therapy.
Custom Shielding Preamble Text Will Not Be Included With Simple Simulations (.......... X X Y Cm): A lead shield of 0.762 mm thickness is utilized to form a molded, custom shield with a
Assessment: Appropriate reaction
Field Size (Applicator): 2.5 cm
Daily Fractionated Dose (Optional- Include Units): 381.14cGy
Energy (Optional-Please Include Units): 70kV
Comments: On [employer], RTOG 1, Decrease in energy from 70Kv to 50Kv per prescription protocol
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
Total Number Of Fractions Rx 2: 8
Custom Shielding Afterword Text Will Not Be Included With Simple Simulations (X X Y Cm............): port to correlate with the lesion size, including treatment margin. The custom lead shield is adequate to accommodate the appropriate applicator and provide adequate shielding around the treatment site. Additional shielding (as noted below) is used to protect sensitive, normal tissues.
Cumulative Dose In Cgy (Optional): 2289.2
Treatment Time / Fractionation (Optional- Include Units) Rx 2: 0.50min
Functional Status: 1 (ambulatory, light activity)
Field Number: 3

## 2018-11-14 NOTE — PROCEDURE: TREATMENT REGIMEN
Modify Regimen: Decrease in energy from 70kv to 50Kv on all sites, per prescription protocol
Detail Level: Zone
Plan: This patient has been treated today with superficial radiation therapy for non-melanoma skin cancer.   \\n \\nWritten informed consent has been previously obtained from this patient for this treatment.  This consent is documented in the patient’s chart.  The patient gave verbal consent to continue treatment today.\\n \\nThe patient was treated with a specific radiation dose and setup as prescribed by the provider listed on this visit note.  A Radiation Therapist performed administration of radiation. The treatment parameters and cumulative dose are indicated above. \\n \\nPrior to administering the radiation, the patient underwent a verification simulation defining relevant normal and abnormal target anatomy and acquiring images and data necessary to develop optimal radiation treatment process for the patient. This process includes verification of the treatment port(s) and proper treatment positioning.  All treatment ports were photographed.  The patient’s customized lead blocking was confirmed.   Considering, superficial radiotherapy setup is a clinical setup, this requires physician and radiation therapist to clarify location interest being treated against initial images, pathology and patient anatomy. Care was taken ensuring fields treated were geometrically accurate and properly positioned using daily verification simulation.  This process is also utilized to determine if any changes are necessary.   These steps are therefore medically necessary ensuring safe and effective administration of radiation.\\n \\nA high frequency ultrasound image was acquired today for two-dimensional evaluation of the tumor volume and response to treatment, in addition to geometric accuracy of field placement. The daily field placement is separate and distinct from the initial simulation and is an important task in providing safe administration of radiation therapy. Physician evaluation of the ultrasound tumor depth will be ongoing throughout the course of treatment and is deemed medically necessary in order to ensure the efficacy of treatment. Today’s image was evaluated for determination of continuation of treatment with the current plan or with necessary changes as appropriate. Additionally, the use of visualization and targeted assessment allows the patient to be able to see their cancer(s) progress, encouraging patient to complete full course of radiotherapy. \\n \\nPer Dr. Childress, continued daily US guidance and clinical setup simulation is required for field placement, measurement of tumor depth, progress and acute effect monitoring.\\n\\nL. Central Forehead\\nUs depth is 0.0mm, Repop +++, MAXIMUS 0.0mm sq\\n\\nL. Medial Frontal Scalp\\nUS depth is 0.7mm, Repop +++, MAXIMUS equivocal\\n\\nMid- Occipital Scalp\\nUS depth is 0.0mm, Repop +++, MAXIMUS 0.0mm sq

## 2018-11-19 ENCOUNTER — APPOINTMENT (OUTPATIENT)
Age: 73
Setting detail: DERMATOLOGY
End: 2018-11-19

## 2018-11-19 PROBLEM — C44.42 SQUAMOUS CELL CARCINOMA OF SKIN OF SCALP AND NECK: Status: ACTIVE | Noted: 2018-11-19

## 2018-11-19 PROBLEM — C44.329 SQUAMOUS CELL CARCINOMA OF SKIN OF OTHER PARTS OF FACE: Status: ACTIVE | Noted: 2018-11-19

## 2018-11-19 PROCEDURE — 77280 THER RAD SIMULAJ FIELD SMPL: CPT

## 2018-11-19 PROCEDURE — G6001 ECHO GUIDANCE RADIOTHERAPY: HCPCS

## 2018-11-19 PROCEDURE — OTHER FOLLOW UP FOR NEXT VISIT: OTHER

## 2018-11-19 PROCEDURE — OTHER TREATMENT REGIMEN: OTHER

## 2018-11-19 PROCEDURE — 77401 RADIATION TX DELIVERY SUPFC: CPT

## 2018-11-19 PROCEDURE — OTHER SUPERFICIAL RADIATION TREATMENT: OTHER

## 2018-11-19 NOTE — PROCEDURE: SUPERFICIAL RADIATION TREATMENT
Custom Shielding Afterword Text Will Not Be Included With Simple Simulations (X X Y Cm............): port to correlate with the lesion size, including treatment margin. The custom lead shield is adequate to accommodate the appropriate applicator and provide adequate shielding around the treatment site. Additional shielding (as noted below) is used to protect sensitive, normal tissues.
Number Of Treatment Days: 1
Patient Positioning: Sitting
Fractions / Week Rx 2: 2
Fractions / Week Rx 4: 5
Simple Simulation Afterword Text Will Be Included With Simple Simulations (Indications............): The patient had a complete consultation regarding all applicable modalities for the treatment of their skin cancer and based on a variety of factors including the type of tumor, size, and location, the relevant medical history as well as local tissue factors, the functional status of the individual, the ability to perform necessary postoperative wound instructions and the need for simultaneous treatments as well as overall wound healing status, it was determined that the patient would begin radiation therapy treatment for skin cancer.  A full simulation and treatment device design was performed including the determination and formulation of appropriate simple and complex devices including lead shield of 0.762 mm thickness to form molded customized shielding to specifically correlate with the lesion size including treatment margin.  The custom lead shield is adequate to accommodate the appropriate applicator and provide adequate shielding around the treatment site.  The specific field applicator, shields, and devices both simple and complex as well as the specific patient setup is outlined below.  The patient was given a full consent for superficial radiation to both verbally and in writing and the full determination of patient's eligibility for treatment and selection is outlined on the patient eligibility and treatment selection form.  The specific superficial radiotherapy prescription was determined and was documented on the superficial radiotherapy prescription form.  A treatment calculation was also performed and documented on the treatment calculation form.  Based on the prescription, the patient was scheduled for a series of fractional treatments.
Total Number Of Fractions Rx 2: 8
Daily Fractionated Dose (Optional- Include Units) Rx 2: 383.5cGy
Shielding Size (Optional- Include Units): 2.0cm x 2.0cm
Total Number Of Fractions Rx 4: 15
Render Text From Evaluation And Management Tab (Will Not Bill 38305): No
Energy (Optional-Please Include Units) Rx 2: 50Kv
Computed Treatment Time In Min (Will Render The Same As Calculated Treatment Time If Left Blank): 0.50
Field Size (Applicator) Rx 2: 2.5 cm
Dose Per Fractionation In Cgy (Optional): 383.5
Pathology Override (Pathology Will Render As Diagnosis Name If Left Blank): Superficially Invasive Squamous Cell Carcinoma
Day Of The Week Treatment Administered: Monday
Time Dose Fractionation (Optional- Include Units If Applicable) Rx 2: 61
Fractionation Number: 7
Include Rx 2 When Rendering Additional Prescriptions: Yes
Shielding Size (Optional- Include Units) Rx 2: 2.0 x 2.0 cm
Treatment Device Design After Initial Simulation Justification (Will Render If Bill For Treatment Devices = Yes): The patient is status post radiation simulation and is evaluated as to the use of additional devices for shielding and placement for radiation therapy.
Functional Status: 1 (ambulatory, light activity)
Cumulative Dose In Cgy (Optional): 2672.7
Total Dose (Optional-Please Include Units): 1905.7cGy
Detail Level: Detailed
Treatment Margins In Cm: 0.5
Assessment: Appropriate reaction
Intro Statement (Will Not Render If Left Blank): The patient is undergoing superficial radiation therapy for skin cancer and presents for weekly evaluation and management.  Per protocol and as documented on the flow sheet, the patient was questioned as to subjective redness, pruritus, pain, drainage, fatigue, or any other symptoms.  Objectively, the radiation area was evaluated with regards to erythema, atrophy, scale, crusting, erosion, ulceration, edema, purpura, tenderness, warmth, drainage, and any other findings.  The plan was extensively reviewed including the dose, and dosing schedule.  The simulation and clinical setup was also reviewed as was the external and any internal shields and based on this review the appropriateness and sufficiency of treatment was determined.
Time Dose Fractionation (Optional- Include Units If Applicable): 38
Treatment Time / Fractionation (Optional- Include Units) Rx 2: 0.50min
Daily Fractionated Dose (Optional- Include Units): 381.14cGy
Treatment Time / Fractionation (Optional- Include Units): 0.59Min
Field Number: 3
Custom Shielding Preamble Text Will Not Be Included With Simple Simulations (.......... X X Y Cm): A lead shield of 0.762 mm thickness is utilized to form a molded, custom shield with a
Simple Simulation Preamble Text Will Be Included With Simple Simulations (.......... Indications): Simple simulation was performed today for the following reasons:
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
Total Dose (Optional-Please Include Units) Rx 2: 3068.0cGy
Energy (Optional-Please Include Units): 70kV
Comments: On [employer], RTOG 1, Decrease in energy from 70Kv to 50Kv per prescription protocol

## 2018-11-19 NOTE — PROCEDURE: TREATMENT REGIMEN
Detail Level: Zone
Plan: This patient has been treated today with superficial radiation therapy for non-melanoma skin cancer.   \\n \\nWritten informed consent has been previously obtained from this patient for this treatment.  This consent is documented in the patient’s chart.  The patient gave verbal consent to continue treatment today.\\n \\nThe patient was treated with a specific radiation dose and setup as prescribed by the provider listed on this visit note.  A Radiation Therapist performed administration of radiation. The treatment parameters and cumulative dose are indicated above. \\n \\nPrior to administering the radiation, the patient underwent a verification simulation defining relevant normal and abnormal target anatomy and acquiring images and data necessary to develop optimal radiation treatment process for the patient. This process includes verification of the treatment port(s) and proper treatment positioning.  All treatment ports were photographed.  The patient’s customized lead blocking was confirmed.   Considering, superficial radiotherapy setup is a clinical setup, this requires physician and radiation therapist to clarify location interest being treated against initial images, pathology and patient anatomy. Care was taken ensuring fields treated were geometrically accurate and properly positioned using daily verification simulation.  This process is also utilized to determine if any changes are necessary.   These steps are therefore medically necessary ensuring safe and effective administration of radiation.\\n \\nA high frequency ultrasound image was acquired today for two-dimensional evaluation of the tumor volume and response to treatment, in addition to geometric accuracy of field placement. The daily field placement is separate and distinct from the initial simulation and is an important task in providing safe administration of radiation therapy. Physician evaluation of the ultrasound tumor depth will be ongoing throughout the course of treatment and is deemed medically necessary in order to ensure the efficacy of treatment. Today’s image was evaluated for determination of continuation of treatment with the current plan or with necessary changes as appropriate. Additionally, the use of visualization and targeted assessment allows the patient to be able to see their cancer(s) progress, encouraging patient to complete full course of radiotherapy. \\n \\nPer Dr. Childress, continued daily US guidance and clinical setup simulation is required for field placement, measurement of tumor depth, progress and acute effect monitoring.\\n\\nL. Central Forehead\\nUs depth is 0.0mm, Repop +++, MAXIMUS 0.0mm sq\\n\\nL. Medial Frontal Scalp\\nUS depth is 0.0mm, Repop +++, MAXIMUS 0.0mm sq\\n\\nMid- Occipital Scalp\\nUS depth is 0.0mm, Repop +++, MAXIMUS 0.0mm sq

## 2018-11-21 ENCOUNTER — APPOINTMENT (OUTPATIENT)
Age: 73
Setting detail: DERMATOLOGY
End: 2018-11-21

## 2018-11-21 PROBLEM — C44.42 SQUAMOUS CELL CARCINOMA OF SKIN OF SCALP AND NECK: Status: ACTIVE | Noted: 2018-11-21

## 2018-11-21 PROBLEM — C44.329 SQUAMOUS CELL CARCINOMA OF SKIN OF OTHER PARTS OF FACE: Status: ACTIVE | Noted: 2018-11-21

## 2018-11-21 PROCEDURE — OTHER TREATMENT REGIMEN: OTHER

## 2018-11-21 PROCEDURE — OTHER SUPERFICIAL RADIATION TREATMENT: OTHER

## 2018-11-21 PROCEDURE — G6001 ECHO GUIDANCE RADIOTHERAPY: HCPCS

## 2018-11-21 PROCEDURE — 77280 THER RAD SIMULAJ FIELD SMPL: CPT

## 2018-11-21 PROCEDURE — 77401 RADIATION TX DELIVERY SUPFC: CPT

## 2018-11-21 PROCEDURE — OTHER FOLLOW UP FOR NEXT VISIT: OTHER

## 2018-11-21 NOTE — PROCEDURE: TREATMENT REGIMEN
Plan: This patient has been treated today with superficial radiation therapy for non-melanoma skin cancer.   \\n \\nWritten informed consent has been previously obtained from this patient for this treatment.  This consent is documented in the patient’s chart.  The patient gave verbal consent to continue treatment today.\\n \\nThe patient was treated with a specific radiation dose and setup as prescribed by the provider listed on this visit note.  A Radiation Therapist performed administration of radiation. The treatment parameters and cumulative dose are indicated above. \\n \\nPrior to administering the radiation, the patient underwent a verification simulation defining relevant normal and abnormal target anatomy and acquiring images and data necessary to develop optimal radiation treatment process for the patient. This process includes verification of the treatment port(s) and proper treatment positioning.  All treatment ports were photographed.  The patient’s customized lead blocking was confirmed.   Considering, superficial radiotherapy setup is a clinical setup, this requires physician and radiation therapist to clarify location interest being treated against initial images, pathology and patient anatomy. Care was taken ensuring fields treated were geometrically accurate and properly positioned using daily verification simulation.  This process is also utilized to determine if any changes are necessary.   These steps are therefore medically necessary ensuring safe and effective administration of radiation.\\n \\nA high frequency ultrasound image was acquired today for two-dimensional evaluation of the tumor volume and response to treatment, in addition to geometric accuracy of field placement. The daily field placement is separate and distinct from the initial simulation and is an important task in providing safe administration of radiation therapy. Physician evaluation of the ultrasound tumor depth will be ongoing throughout the course of treatment and is deemed medically necessary in order to ensure the efficacy of treatment. Today’s image was evaluated for determination of continuation of treatment with the current plan or with necessary changes as appropriate. Additionally, the use of visualization and targeted assessment allows the patient to be able to see their cancer(s) progress, encouraging patient to complete full course of radiotherapy. \\n \\nPer Dr. Childress, continued daily US guidance and clinical setup simulation is required for field placement, measurement of tumor depth, progress and acute effect monitoring.\\n\\nL. Central Forehead\\nUs depth is 0.0mm, Repop +++, MAXIMUS 0.0mm sq\\n\\nL. Medial Frontal Scalp\\nUS depth is 0.0mm, Repop +++, MAXIMUS 0.0mm sq\\n\\nMid- Occipital Scalp\\nUS depth is 0.0mm, Repop +++, MAXIMUS 0.0mm sq
Detail Level: Zone

## 2018-11-21 NOTE — PROCEDURE: SUPERFICIAL RADIATION TREATMENT
Include Rx 2 When Rendering Additional Prescriptions: Yes
Dimensions-Y Axis In Cm: 1
Field Size (Applicator): 2.5 cm
Custom Shielding Afterword Text Will Not Be Included With Simple Simulations (X X Y Cm............): port to correlate with the lesion size, including treatment margin. The custom lead shield is adequate to accommodate the appropriate applicator and provide adequate shielding around the treatment site. Additional shielding (as noted below) is used to protect sensitive, normal tissues.
Dose / Tx In Cgy (Optional): 383.5
Energy (Optional-Please Include Units) Rx 2: 50Kv
Bill For Simulation And Treatment Device Design: No
Shielding Size (Optional- Include Units) Rx 2: 2.0 x 2.0 cm
Treatment Device Design After Initial Simulation Justification (Will Render If Bill For Treatment Devices = Yes): The patient is status post radiation simulation and is evaluated as to the use of additional devices for shielding and placement for radiation therapy.
Total Number Of Fractions: 5
Assessment: Appropriate reaction
Simple Simulation Afterword Text Will Be Included With Simple Simulations (Indications............): The patient had a complete consultation regarding all applicable modalities for the treatment of their skin cancer and based on a variety of factors including the type of tumor, size, and location, the relevant medical history as well as local tissue factors, the functional status of the individual, the ability to perform necessary postoperative wound instructions and the need for simultaneous treatments as well as overall wound healing status, it was determined that the patient would begin radiation therapy treatment for skin cancer.  A full simulation and treatment device design was performed including the determination and formulation of appropriate simple and complex devices including lead shield of 0.762 mm thickness to form molded customized shielding to specifically correlate with the lesion size including treatment margin.  The custom lead shield is adequate to accommodate the appropriate applicator and provide adequate shielding around the treatment site.  The specific field applicator, shields, and devices both simple and complex as well as the specific patient setup is outlined below.  The patient was given a full consent for superficial radiation to both verbally and in writing and the full determination of patient's eligibility for treatment and selection is outlined on the patient eligibility and treatment selection form.  The specific superficial radiotherapy prescription was determined and was documented on the superficial radiotherapy prescription form.  A treatment calculation was also performed and documented on the treatment calculation form.  Based on the prescription, the patient was scheduled for a series of fractional treatments.
Total Dose (Optional-Please Include Units) Rx 2: 3068.0cGy
Functional Status: 1 (ambulatory, light activity)
Simple Simulation Preamble Text Will Be Included With Simple Simulations (.......... Indications): Simple simulation was performed today for the following reasons:
Shielding Size (Optional- Include Units): 2.0cm x 2.0cm
Field Number: 2
Treatment Margins In Cm: 0.5
Daily Fractionated Dose (Optional- Include Units) Rx 2: 383.5cGy
Treatment Time / Fractionation (Optional- Include Units) Rx 2: 0.50min
Cumulative Dose In Cgy (Optional): 3056.2
Detail Level: Detailed
Pathology Override (Pathology Will Render As Diagnosis Name If Left Blank): Superficially Invasive Squamous Cell Carcinoma
Daily Fractionated Dose (Optional- Include Units): 381.14cGy
Computed Treatment Time In Min (Will Render The Same As Calculated Treatment Time If Left Blank): 0.50
Total Number Of Fractions Rx 2: 8
Energy (Optional-Please Include Units): 70kV
Treatment Time / Fractionation (Optional- Include Units): 0.59Min
Total Number Of Fractions Rx 4: 15
Intro Statement (Will Not Render If Left Blank): The patient is undergoing superficial radiation therapy for skin cancer and presents for weekly evaluation and management.  Per protocol and as documented on the flow sheet, the patient was questioned as to subjective redness, pruritus, pain, drainage, fatigue, or any other symptoms.  Objectively, the radiation area was evaluated with regards to erythema, atrophy, scale, crusting, erosion, ulceration, edema, purpura, tenderness, warmth, drainage, and any other findings.  The plan was extensively reviewed including the dose, and dosing schedule.  The simulation and clinical setup was also reviewed as was the external and any internal shields and based on this review the appropriateness and sufficiency of treatment was determined.
Comments: On Target, RTOG 1, Decrease in energy from 70Kv to 50Kv per prescription protocol
Time Dose Fractionation (Optional- Include Units If Applicable): 38
Day Of The Week Treatment Administered: Wednesday
Time Dose Fractionation (Optional- Include Units If Applicable) Rx 2: 61
Custom Shielding Preamble Text Will Not Be Included With Simple Simulations (.......... X X Y Cm): A lead shield of 0.762 mm thickness is utilized to form a molded, custom shield with a
Total Dose (Optional-Please Include Units): 1905.7cGy
Patient Positioning: Sitting
Field Number: 3
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

## 2018-11-26 ENCOUNTER — APPOINTMENT (OUTPATIENT)
Age: 73
Setting detail: DERMATOLOGY
End: 2018-11-26

## 2018-11-26 PROBLEM — C44.329 SQUAMOUS CELL CARCINOMA OF SKIN OF OTHER PARTS OF FACE: Status: ACTIVE | Noted: 2018-11-26

## 2018-11-26 PROBLEM — C44.42 SQUAMOUS CELL CARCINOMA OF SKIN OF SCALP AND NECK: Status: ACTIVE | Noted: 2018-11-26

## 2018-11-26 PROCEDURE — OTHER SUPERFICIAL RADIATION TREATMENT: OTHER

## 2018-11-26 PROCEDURE — 77280 THER RAD SIMULAJ FIELD SMPL: CPT

## 2018-11-26 PROCEDURE — OTHER FOLLOW UP FOR NEXT VISIT: OTHER

## 2018-11-26 PROCEDURE — OTHER TREATMENT REGIMEN: OTHER

## 2018-11-26 PROCEDURE — 77401 RADIATION TX DELIVERY SUPFC: CPT

## 2018-11-26 PROCEDURE — G6001 ECHO GUIDANCE RADIOTHERAPY: HCPCS

## 2018-11-26 NOTE — PROCEDURE: SUPERFICIAL RADIATION TREATMENT
Total Dose (Optional-Please Include Units): 1905.7cGy
Simple Simulation Preamble Text Will Be Included With Simple Simulations (.......... Indications): Simple simulation was performed today for the following reasons:
Bill For Dosimetry/Render Decay And Dose Adjustment Calculation In Note: No
Comments: On Target, RTOG 1, Decrease in energy from 70Kv to 50Kv per prescription protocol
Shielding Size (Optional- Include Units) Rx 2: 2.0 x 2.0 cm
Fractions / Week Rx 2: 2
Dimensions-X Axis In Cm: 1
Assessment: Appropriate reaction
Treatment Device Design After Initial Simulation Justification (Will Render If Bill For Treatment Devices = Yes): The patient is status post radiation simulation and is evaluated as to the use of additional devices for shielding and placement for radiation therapy.
Field Size (Applicator) Rx 2: 2.5 cm
Energy (Optional-Please Include Units): 70kV
Simple Simulation Afterword Text Will Be Included With Simple Simulations (Indications............): The patient had a complete consultation regarding all applicable modalities for the treatment of their skin cancer and based on a variety of factors including the type of tumor, size, and location, the relevant medical history as well as local tissue factors, the functional status of the individual, the ability to perform necessary postoperative wound instructions and the need for simultaneous treatments as well as overall wound healing status, it was determined that the patient would begin radiation therapy treatment for skin cancer.  A full simulation and treatment device design was performed including the determination and formulation of appropriate simple and complex devices including lead shield of 0.762 mm thickness to form molded customized shielding to specifically correlate with the lesion size including treatment margin.  The custom lead shield is adequate to accommodate the appropriate applicator and provide adequate shielding around the treatment site.  The specific field applicator, shields, and devices both simple and complex as well as the specific patient setup is outlined below.  The patient was given a full consent for superficial radiation to both verbally and in writing and the full determination of patient's eligibility for treatment and selection is outlined on the patient eligibility and treatment selection form.  The specific superficial radiotherapy prescription was determined and was documented on the superficial radiotherapy prescription form.  A treatment calculation was also performed and documented on the treatment calculation form.  Based on the prescription, the patient was scheduled for a series of fractional treatments.
Patient Positioning: Sitting
Treatment Margins In Cm: 0.5
Functional Status: 1 (ambulatory, light activity)
Dose / Tx In Cgy (Optional): 383.5
Day Of The Week Treatment Administered: Monday
Total Dose (Optional-Please Include Units) Rx 2: 3068.0cGy
Fractions / Week Rx 4: 5
Intro Statement (Will Not Render If Left Blank): The patient is undergoing superficial radiation therapy for skin cancer and presents for weekly evaluation and management.  Per protocol and as documented on the flow sheet, the patient was questioned as to subjective redness, pruritus, pain, drainage, fatigue, or any other symptoms.  Objectively, the radiation area was evaluated with regards to erythema, atrophy, scale, crusting, erosion, ulceration, edema, purpura, tenderness, warmth, drainage, and any other findings.  The plan was extensively reviewed including the dose, and dosing schedule.  The simulation and clinical setup was also reviewed as was the external and any internal shields and based on this review the appropriateness and sufficiency of treatment was determined.
Pathology Override (Pathology Will Render As Diagnosis Name If Left Blank): Superficially Invasive Squamous Cell Carcinoma
Energy (Optional-Please Include Units): 50kV
Shielding Size (Optional- Include Units): 2.0cm x 2.0cm
Include Rx 2 When Rendering Additional Prescriptions: Yes
Time Dose Fractionation (Optional- Include Units If Applicable) Rx 2: 61
Total Number Of Fractions Rx 4: 15
Total Number Of Fractions Rx 2: 8
Treatment Time / Fractionation (Optional- Include Units) Rx 2: 0.50min
Custom Shielding Afterword Text Will Not Be Included With Simple Simulations (X X Y Cm............): port to correlate with the lesion size, including treatment margin. The custom lead shield is adequate to accommodate the appropriate applicator and provide adequate shielding around the treatment site. Additional shielding (as noted below) is used to protect sensitive, normal tissues.
Computed Treatment Time In Min (Will Render The Same As Calculated Treatment Time If Left Blank): 0.50
Custom Shielding Preamble Text Will Not Be Included With Simple Simulations (.......... X X Y Cm): A lead shield of 0.762 mm thickness is utilized to form a molded, custom shield with a
Field Number: 3
Treatment Time / Fractionation (Optional- Include Units): 0.59Min
Detail Level: Detailed
Time Dose Fractionation (Optional- Include Units If Applicable): 38
Daily Fractionated Dose (Optional- Include Units) Rx 2: 383.5cGy
Daily Fractionated Dose (Optional- Include Units): 381.14cGy
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
Fractionation Number: 9
Cumulative Dose In Cgy (Optional): 3439.70

## 2018-11-28 ENCOUNTER — APPOINTMENT (OUTPATIENT)
Age: 73
Setting detail: DERMATOLOGY
End: 2018-12-03

## 2018-11-28 PROBLEM — C44.329 SQUAMOUS CELL CARCINOMA OF SKIN OF OTHER PARTS OF FACE: Status: ACTIVE | Noted: 2018-11-28

## 2018-11-28 PROBLEM — C44.42 SQUAMOUS CELL CARCINOMA OF SKIN OF SCALP AND NECK: Status: ACTIVE | Noted: 2018-11-28

## 2018-11-28 PROCEDURE — OTHER FOLLOW UP FOR NEXT VISIT: OTHER

## 2018-11-28 PROCEDURE — 99212 OFFICE O/P EST SF 10 MIN: CPT | Mod: 25

## 2018-11-28 PROCEDURE — G6001 ECHO GUIDANCE RADIOTHERAPY: HCPCS

## 2018-11-28 PROCEDURE — OTHER SUPERFICIAL RADIATION TREATMENT: OTHER

## 2018-11-28 PROCEDURE — OTHER TREATMENT REGIMEN: OTHER

## 2018-11-28 PROCEDURE — 77401 RADIATION TX DELIVERY SUPFC: CPT

## 2018-11-28 PROCEDURE — 77280 THER RAD SIMULAJ FIELD SMPL: CPT

## 2018-11-28 NOTE — PROCEDURE: SUPERFICIAL RADIATION TREATMENT
Time Dose Fractionation (Optional- Include Units If Applicable): 38
Dimensions-Y Axis In Cm: 1
Patel For Simulation Without Treatment Device Design (Simple Simulation): No
Energy (Optional-Please Include Units): 50kV
Field Size (Applicator): 2.5 cm
Functional Status: 1 (ambulatory, light activity)
Energy (Optional-Please Include Units): 70kV
Port Dimensions-Y Axis In Cm: 2
Dose / Tx In Cgy (Optional): 383.5
Fractionation Number (Evaluation): 5
Treatment Margins In Cm: 0.5
Field Number: 3
Simple Simulation Afterword Text Will Be Included With Simple Simulations (Indications............): The patient had a complete consultation regarding all applicable modalities for the treatment of their skin cancer and based on a variety of factors including the type of tumor, size, and location, the relevant medical history as well as local tissue factors, the functional status of the individual, the ability to perform necessary postoperative wound instructions and the need for simultaneous treatments as well as overall wound healing status, it was determined that the patient would begin radiation therapy treatment for skin cancer.  A full simulation and treatment device design was performed including the determination and formulation of appropriate simple and complex devices including lead shield of 0.762 mm thickness to form molded customized shielding to specifically correlate with the lesion size including treatment margin.  The custom lead shield is adequate to accommodate the appropriate applicator and provide adequate shielding around the treatment site.  The specific field applicator, shields, and devices both simple and complex as well as the specific patient setup is outlined below.  The patient was given a full consent for superficial radiation to both verbally and in writing and the full determination of patient's eligibility for treatment and selection is outlined on the patient eligibility and treatment selection form.  The specific superficial radiotherapy prescription was determined and was documented on the superficial radiotherapy prescription form.  A treatment calculation was also performed and documented on the treatment calculation form.  Based on the prescription, the patient was scheduled for a series of fractional treatments.
Treatment Time / Fractionation (Optional- Include Units) Rx 2: 0.50min
Treatment Time In Min (Optional): 0.50
Total Dose (Optional-Please Include Units) Rx 2: 3068.0cGy
Pathology Override (Pathology Will Render As Diagnosis Name If Left Blank): Superficially Invasive Squamous Cell Carcinoma
Cumulative Dose In Cgy (Optional): 3823.2
Treatment Device Design After Initial Simulation Justification (Will Render If Bill For Treatment Devices = Yes): The patient is status post radiation simulation and is evaluated as to the use of additional devices for shielding and placement for radiation therapy.
Simple Simulation Preamble Text Will Be Included With Simple Simulations (.......... Indications): Simple simulation was performed today for the following reasons:
Include Rx 2 When Rendering Additional Prescriptions: Yes
Fractionation Number: 10
Daily Fractionated Dose (Optional- Include Units): 381.14cGy
Day Of The Week Treatment Administered: Wednesday
Time Dose Fractionation (Optional- Include Units If Applicable) Rx 2: 61
Treatment Time / Fractionation (Optional- Include Units): 0.59Min
Assessment: Appropriate reaction
Custom Shielding Afterword Text Will Not Be Included With Simple Simulations (X X Y Cm............): port to correlate with the lesion size, including treatment margin. The custom lead shield is adequate to accommodate the appropriate applicator and provide adequate shielding around the treatment site. Additional shielding (as noted below) is used to protect sensitive, normal tissues.
Daily Fractionated Dose (Optional- Include Units) Rx 2: 383.5cGy
Shielding Size (Optional- Include Units) Rx 2: 2.0 x 2.0 cm
Total Dose (Optional-Please Include Units): 1905.7cGy
Total Number Of Fractions Rx 4: 15
Comments: On Target, RTOG 1
Custom Shielding Preamble Text Will Not Be Included With Simple Simulations (.......... X X Y Cm): A lead shield of 0.762 mm thickness is utilized to form a molded, custom shield with a
Detail Level: Detailed
Total Number Of Fractions Rx 2: 8
Intro Statement (Will Not Render If Left Blank): The patient is undergoing superficial radiation therapy for skin cancer and presents for weekly evaluation and management.  Per protocol and as documented on the flow sheet, the patient was questioned as to subjective redness, pruritus, pain, drainage, fatigue, or any other symptoms.  Objectively, the radiation area was evaluated with regards to erythema, atrophy, scale, crusting, erosion, ulceration, edema, purpura, tenderness, warmth, drainage, and any other findings.  The plan was extensively reviewed including the dose, and dosing schedule.  The simulation and clinical setup was also reviewed as was the external and any internal shields and based on this review the appropriateness and sufficiency of treatment was determined.
Shielding Size (Optional- Include Units): 2.0cm x 2.0cm
Patient Positioning: Sitting
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

## 2018-11-28 NOTE — PROCEDURE: TREATMENT REGIMEN
Detail Level: Zone
Plan: This patient has been treated today with superficial radiation therapy for non-melanoma skin cancer.   \\n \\nWritten informed consent has been previously obtained from this patient for this treatment.  This consent is documented in the patient’s chart.  The patient gave verbal consent to continue treatment today.\\n \\nThe patient was treated with a specific radiation dose and setup as prescribed by the provider listed on this visit note.  A Radiation Therapist performed administration of radiation. The treatment parameters and cumulative dose are indicated above. \\n \\nPrior to administering the radiation, the patient underwent a verification simulation defining relevant normal and abnormal target anatomy and acquiring images and data necessary to develop optimal radiation treatment process for the patient. This process includes verification of the treatment port(s) and proper treatment positioning.  All treatment ports were photographed.  The patient’s customized lead blocking was confirmed.   Considering, superficial radiotherapy setup is a clinical setup, this requires physician and radiation therapist to clarify location interest being treated against initial images, pathology and patient anatomy. Care was taken ensuring fields treated were geometrically accurate and properly positioned using daily verification simulation.  This process is also utilized to determine if any changes are necessary.   These steps are therefore medically necessary ensuring safe and effective administration of radiation.\\n \\nA high frequency ultrasound image was acquired today for two-dimensional evaluation of the tumor volume and response to treatment, in addition to geometric accuracy of field placement. The daily field placement is separate and distinct from the initial simulation and is an important task in providing safe administration of radiation therapy. Physician evaluation of the ultrasound tumor depth will be ongoing throughout the course of treatment and is deemed medically necessary in order to ensure the efficacy of treatment. Today’s image was evaluated for determination of continuation of treatment with the current plan or with necessary changes as appropriate. Additionally, the use of visualization and targeted assessment allows the patient to be able to see their cancer(s) progress, encouraging patient to complete full course of radiotherapy. \\n \\nPer Dr. Childress, continued daily US guidance and clinical setup simulation is required for field placement, measurement of tumor depth, progress and acute effect monitoring.\\n\\nL. Central Forehead\\nUs depth is 0.0mm, Repop +++, MAXIMUS 0.0mm sq\\n\\nL. Medial Frontal Scalp\\nUS depth is 0.0mm, Repop +++, MAXIMUS 0.0mm sq\\n\\nMid- Occipital Scalp\\nUS depth is 0.83mm(edema), Repop +++, MAXIMUS equivocal due to edema

## 2018-11-28 NOTE — PROCEDURE: FOLLOW UP FOR NEXT VISIT
Detail Level: Simple
Instructions (Optional): SRT,  Patient will finish remainder of his treatments at the [place] office, due to patient moving.

## 2018-12-05 ENCOUNTER — APPOINTMENT (OUTPATIENT)
Age: 73
Setting detail: DERMATOLOGY
End: 2018-12-05

## 2018-12-05 PROBLEM — C44.42 SQUAMOUS CELL CARCINOMA OF SKIN OF SCALP AND NECK: Status: ACTIVE | Noted: 2018-12-05

## 2018-12-05 PROBLEM — C44.329 SQUAMOUS CELL CARCINOMA OF SKIN OF OTHER PARTS OF FACE: Status: ACTIVE | Noted: 2018-12-05

## 2018-12-05 PROCEDURE — 77280 THER RAD SIMULAJ FIELD SMPL: CPT

## 2018-12-05 PROCEDURE — G6001 ECHO GUIDANCE RADIOTHERAPY: HCPCS

## 2018-12-05 PROCEDURE — OTHER SUPERFICIAL RADIATION TREATMENT: OTHER

## 2018-12-05 PROCEDURE — OTHER TREATMENT REGIMEN: OTHER

## 2018-12-05 PROCEDURE — 77401 RADIATION TX DELIVERY SUPFC: CPT

## 2018-12-05 PROCEDURE — 77300 RADIATION THERAPY DOSE PLAN: CPT

## 2018-12-05 PROCEDURE — OTHER FOLLOW UP FOR NEXT VISIT: OTHER

## 2018-12-05 NOTE — PROCEDURE: SUPERFICIAL RADIATION TREATMENT
Intro Statement (Will Not Render If Left Blank): The patient is undergoing superficial radiation therapy for skin cancer and presents for weekly evaluation and management.  Per protocol and as documented on the flow sheet, the patient was questioned as to subjective redness, pruritus, pain, drainage, fatigue, or any other symptoms.  Objectively, the radiation area was evaluated with regards to erythema, atrophy, scale, crusting, erosion, ulceration, edema, purpura, tenderness, warmth, drainage, and any other findings.  The plan was extensively reviewed including the dose, and dosing schedule.  The simulation and clinical setup was also reviewed as was the external and any internal shields and based on this review the appropriateness and sufficiency of treatment was determined.
Assessment: Appropriate reaction
Dimensions-X Axis In Cm: 1
Energy (Include Units): 50kV
Daily Fractionated Dose (Optional- Include Units) Rx 2: 383.5cGy
Bill For Treatment Devices Only: No
Pathology Override (Pathology Will Render As Diagnosis Name If Left Blank): Superficially Invasive Squamous Cell Carcinoma
Total Number Of Fractions: 5
Total Dose (Optional-Please Include Units): 1905.7cGy
Treatment Device Design After Initial Simulation Justification (Will Render If Bill For Treatment Devices = Yes): The patient is status post radiation simulation and is evaluated as to the use of additional devices for shielding and placement for radiation therapy.
Field Size (Applicator): 2.5 cm
Dose / Tx In Cgy (Optional): 378
Custom Shielding Preamble Text Will Not Be Included With Simple Simulations (.......... X X Y Cm): A lead shield of 0.762 mm thickness is utilized to form a molded, custom shield with a
Day Of The Week Treatment Administered: Wednesday
Cumulative Dose In Cgy (Optional): 4201.2
Fractions / Week Rx 2: 2
Computed Treatment Time In Min (Will Render The Same As Calculated Treatment Time If Left Blank): 0.54
Include Rx 2 When Rendering Additional Prescriptions: Yes
Total Number Of Fractions Rx 2: 8
Treatment Time / Fractionation (Optional- Include Units) Rx 2: 0.50min
Shielding Size (Optional- Include Units) Rx 2: 2.0 x 2.0 cm
Simple Simulation Preamble Text Will Be Included With Simple Simulations (.......... Indications): Simple simulation was performed today for the following reasons:
Time Dose Fractionation (Optional- Include Units If Applicable) Rx 2: 61
Fractionation Number: 11
Simple Simulation Afterword Text Will Be Included With Simple Simulations (Indications............): The patient had a complete consultation regarding all applicable modalities for the treatment of their skin cancer and based on a variety of factors including the type of tumor, size, and location, the relevant medical history as well as local tissue factors, the functional status of the individual, the ability to perform necessary postoperative wound instructions and the need for simultaneous treatments as well as overall wound healing status, it was determined that the patient would begin radiation therapy treatment for skin cancer.  A full simulation and treatment device design was performed including the determination and formulation of appropriate simple and complex devices including lead shield of 0.762 mm thickness to form molded customized shielding to specifically correlate with the lesion size including treatment margin.  The custom lead shield is adequate to accommodate the appropriate applicator and provide adequate shielding around the treatment site.  The specific field applicator, shields, and devices both simple and complex as well as the specific patient setup is outlined below.  The patient was given a full consent for superficial radiation to both verbally and in writing and the full determination of patient's eligibility for treatment and selection is outlined on the patient eligibility and treatment selection form.  The specific superficial radiotherapy prescription was determined and was documented on the superficial radiotherapy prescription form.  A treatment calculation was also performed and documented on the treatment calculation form.  Based on the prescription, the patient was scheduled for a series of fractional treatments.
Custom Shielding Afterword Text Will Not Be Included With Simple Simulations (X X Y Cm............): port to correlate with the lesion size, including treatment margin. The custom lead shield is adequate to accommodate the appropriate applicator and provide adequate shielding around the treatment site. Additional shielding (as noted below) is used to protect sensitive, normal tissues.
Functional Status: 1 (ambulatory, light activity)
Patient Positioning: Sitting
Total Number Of Fractions Rx 3: 15
Shielding Size (Optional- Include Units): 2.0cm x 2.0cm
Time Dose Fractionation (Optional- Include Units If Applicable): 38
Energy (Optional-Please Include Units): 70kV
Treatment Margins In Cm: 0.5
Comments: On Target, RTOG 1
Field Number: 3
Treatment Time / Fractionation (Optional- Include Units): 0.59Min
Total Dose (Optional-Please Include Units) Rx 2: 3068.0cGy
Daily Fractionated Dose (Optional- Include Units): 381.14cGy
Dose / Tx In Cgy (Optional): 375
Detail Level: Detailed
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

## 2018-12-05 NOTE — PROCEDURE: TREATMENT REGIMEN
Modify Regimen: Patient was treated at [facility] for fractions 1-10, and will continue and complete treatment at the [facility].
Plan: This patient has been treated today with superficial radiation therapy for non-melanoma skin cancer.   \\n \\nWritten informed consent has been previously obtained from this patient for this treatment.  This consent is documented in the patient’s chart.  The patient gave verbal consent to continue treatment today.\\n \\nThe patient was treated with a specific radiation dose and setup as prescribed by the provider listed on this visit note.  A Radiation Therapist performed administration of radiation. The treatment parameters and cumulative dose are indicated above. \\n \\nPrior to administering the radiation, the patient underwent a verification simulation defining relevant normal and abnormal target anatomy and acquiring images and data necessary to develop optimal radiation treatment process for the patient. This process includes verification of the treatment port(s) and proper treatment positioning.  All treatment ports were photographed.  The patient’s customized lead blocking was confirmed.   Considering, superficial radiotherapy setup is a clinical setup, this requires physician and radiation therapist to clarify location interest being treated against initial images, pathology and patient anatomy. Care was taken ensuring fields treated were geometrically accurate and properly positioned using daily verification simulation.  This process is also utilized to determine if any changes are necessary.   These steps are therefore medically necessary ensuring safe and effective administration of radiation.\\n \\nA high frequency ultrasound image was acquired today for two-dimensional evaluation of the tumor volume and response to treatment, in addition to geometric accuracy of field placement. The daily field placement is separate and distinct from the initial simulation and is an important task in providing safe administration of radiation therapy. Physician evaluation of the ultrasound tumor depth will be ongoing throughout the course of treatment and is deemed medically necessary in order to ensure the efficacy of treatment. Today’s image was evaluated for determination of continuation of treatment with the current plan or with necessary changes as appropriate. Additionally, the use of visualization and targeted assessment allows the patient to be able to see their cancer(s) progress, encouraging patient to complete full course of radiotherapy. \\n \\nPer Dr. Childress, continued daily US guidance and clinical setup simulation is required for field placement, measurement of tumor depth, progress and acute effect monitoring.\\n\\nL. Central Forehead\\nUs depth is 0.0mm, Repop +++, MAXIMUS 0.0mm sq\\n\\nL. Medial Frontal Scalp\\nUS depth is 0.0mm, Repop +++, MAXIMUS 0.0mm sq\\n\\nMid- Occipital Scalp\\nUS depth is 0.57mm(edema), Repop +++, MAXIMUS equivocal due to edema
Detail Level: Zone

## 2018-12-07 ENCOUNTER — APPOINTMENT (OUTPATIENT)
Age: 73
Setting detail: DERMATOLOGY
End: 2018-12-07

## 2018-12-07 PROBLEM — C44.42 SQUAMOUS CELL CARCINOMA OF SKIN OF SCALP AND NECK: Status: ACTIVE | Noted: 2018-12-07

## 2018-12-07 PROBLEM — C44.329 SQUAMOUS CELL CARCINOMA OF SKIN OF OTHER PARTS OF FACE: Status: ACTIVE | Noted: 2018-12-07

## 2018-12-07 PROCEDURE — OTHER SUPERFICIAL RADIATION TREATMENT: OTHER

## 2018-12-07 PROCEDURE — 77401 RADIATION TX DELIVERY SUPFC: CPT

## 2018-12-07 PROCEDURE — G6001 ECHO GUIDANCE RADIOTHERAPY: HCPCS

## 2018-12-07 PROCEDURE — 77280 THER RAD SIMULAJ FIELD SMPL: CPT

## 2018-12-07 PROCEDURE — OTHER FOLLOW UP FOR NEXT VISIT: OTHER

## 2018-12-07 PROCEDURE — OTHER TREATMENT REGIMEN: OTHER

## 2018-12-07 NOTE — PROCEDURE: TREATMENT REGIMEN
Detail Level: Zone
Plan: This patient has been treated today with superficial radiation therapy for non-melanoma skin cancer.   \\n \\nWritten informed consent has been previously obtained from this patient for this treatment.  This consent is documented in the patient’s chart.  The patient gave verbal consent to continue treatment today.\\n \\nThe patient was treated with a specific radiation dose and setup as prescribed by the provider listed on this visit note.  A Radiation Therapist performed administration of radiation. The treatment parameters and cumulative dose are indicated above. \\n \\nPrior to administering the radiation, the patient underwent a verification simulation defining relevant normal and abnormal target anatomy and acquiring images and data necessary to develop optimal radiation treatment process for the patient. This process includes verification of the treatment port(s) and proper treatment positioning.  All treatment ports were photographed.  The patient’s customized lead blocking was confirmed.   Considering, superficial radiotherapy setup is a clinical setup, this requires physician and radiation therapist to clarify location interest being treated against initial images, pathology and patient anatomy. Care was taken ensuring fields treated were geometrically accurate and properly positioned using daily verification simulation.  This process is also utilized to determine if any changes are necessary.   These steps are therefore medically necessary ensuring safe and effective administration of radiation.\\n \\nA high frequency ultrasound image was acquired today for two-dimensional evaluation of the tumor volume and response to treatment, in addition to geometric accuracy of field placement. The daily field placement is separate and distinct from the initial simulation and is an important task in providing safe administration of radiation therapy. Physician evaluation of the ultrasound tumor depth will be ongoing throughout the course of treatment and is deemed medically necessary in order to ensure the efficacy of treatment. Today’s image was evaluated for determination of continuation of treatment with the current plan or with necessary changes as appropriate. Additionally, the use of visualization and targeted assessment allows the patient to be able to see their cancer(s) progress, encouraging patient to complete full course of radiotherapy. \\n \\nPer Dr. Childress, continued daily US guidance and clinical setup simulation is required for field placement, measurement of tumor depth, progress and acute effect monitoring.\\n\\nL. Central Forehead\\nUs depth is 0.0mm, Repop +++, MAXIMUS 0.0mm sq\\n\\nL. Medial Frontal Scalp\\nUS depth is 0.0mm, Repop +++, MAXIMUS 0.0mm sq\\n\\nMid- Occipital Scalp\\nUS depth is 0.61mm(edema), Repop +++, MAXIMUS equivocal due to edema
Modify Regimen: Patient was treated at [facility] for fractions 1-10, and will continue and complete treatment at the [facility].

## 2018-12-07 NOTE — PROCEDURE: SUPERFICIAL RADIATION TREATMENT
Field Size (Applicator) Rx 2: 2.5 cm
Energy (Optional-Please Include Units): 70kV
Dimensions-X Axis In Cm: 1
Computed Treatment Time In Min (Will Render The Same As Calculated Treatment Time If Left Blank): 0.54
Fractions / Week Rx 3: 5
Custom Shielding Afterword Text Will Not Be Included With Simple Simulations (X X Y Cm............): port to correlate with the lesion size, including treatment margin. The custom lead shield is adequate to accommodate the appropriate applicator and provide adequate shielding around the treatment site. Additional shielding (as noted below) is used to protect sensitive, normal tissues.
Treatment Time / Fractionation (Optional- Include Units): 0.59Min
Include Rx 4 When Rendering Additional Prescriptions: No
Detail Level: Detailed
Port Dimensions-Y Axis In Cm: 2
Shielding Size (Optional- Include Units) Rx 2: 2.0 x 2.0 cm
Comments: On Target, RTOG 1
Intro Statement (Will Not Render If Left Blank): The patient is undergoing superficial radiation therapy for skin cancer and presents for weekly evaluation and management.  Per protocol and as documented on the flow sheet, the patient was questioned as to subjective redness, pruritus, pain, drainage, fatigue, or any other symptoms.  Objectively, the radiation area was evaluated with regards to erythema, atrophy, scale, crusting, erosion, ulceration, edema, purpura, tenderness, warmth, drainage, and any other findings.  The plan was extensively reviewed including the dose, and dosing schedule.  The simulation and clinical setup was also reviewed as was the external and any internal shields and based on this review the appropriateness and sufficiency of treatment was determined.
Treatment Time / Fractionation (Optional- Include Units) Rx 2: 0.50min
Total Number Of Fractions Rx 3: 15
Shielding Size (Optional- Include Units): 2.0cm x 2.0cm
Include Rx 2 When Rendering Additional Prescriptions: Yes
Simple Simulation Preamble Text Will Be Included With Simple Simulations (.......... Indications): Simple simulation was performed today for the following reasons:
Day Of The Week Treatment Administered: Friday
Initial Radiation Treatment Planning (Will Render If Bill Simulation = Yes): The patient had a complete consultation regarding all applicable modalities for the treatment of their skin cancer and based on a variety of factors including the type of tumor, size, and location, the relevant medical history as well as local tissue factors, the functional status of the individual, the ability to perform necessary postoperative wound instructions and the need for simultaneous treatments as well as overall wound healing status, it was determined that the patient would begin radiation therapy treatment for skin cancer.  A full simulation and treatment device design was performed including the determination and formulation of appropriate simple and complex devices including lead shield of 0.762 mm thickness to form molded customized shielding to specifically correlate with the lesion size including treatment margin.  The custom lead shield is adequate to accommodate the appropriate applicator and provide adequate shielding around the treatment site.  The specific field applicator, shields, and devices both simple and complex as well as the specific patient setup is outlined below.  The patient was given a full consent for superficial radiation to both verbally and in writing and the full determination of patient's eligibility for treatment and selection is outlined on the patient eligibility and treatment selection form.  The specific superficial radiotherapy prescription was determined and was documented on the superficial radiotherapy prescription form.  A treatment calculation was also performed and documented on the treatment calculation form.  Based on the prescription, the patient was scheduled for a series of fractional treatments.
Daily Fractionated Dose (Optional- Include Units) Rx 2: 383.5cGy
Energy (Optional-Please Include Units) Rx 2: 50Kv
Total Dose (Optional-Please Include Units) Rx 2: 3068.0cGy
Fractionation Number: 12
Pathology Override (Pathology Will Render As Diagnosis Name If Left Blank): Superficially Invasive Squamous Cell Carcinoma
Dose / Tx In Cgy (Optional): 375
Dose Per Fractionation In Cgy (Optional): 378
Total Number Of Fractions Rx 2: 8
Time Dose Fractionation (Optional- Include Units If Applicable) Rx 2: 61
Assessment: Appropriate reaction
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
Time Dose Fractionation (Optional- Include Units If Applicable): 38
Treatment Device Design After Initial Simulation Justification (Will Render If Bill For Treatment Devices = Yes): The patient is status post radiation simulation and is evaluated as to the use of additional devices for shielding and placement for radiation therapy.
Cumulative Dose In Cgy (Optional): 4579.2
Total Dose (Optional-Please Include Units): 1905.7cGy
Field Number: 3
Patient Positioning: Sitting
Treatment Margins In Cm: 0.5
Functional Status: 1 (ambulatory, light activity)
Daily Fractionated Dose (Optional- Include Units): 381.14cGy
Custom Shielding Preamble Text Will Not Be Included With Simple Simulations (.......... X X Y Cm): A lead shield of 0.762 mm thickness is utilized to form a molded, custom shield with a

## 2018-12-10 ENCOUNTER — APPOINTMENT (OUTPATIENT)
Age: 73
Setting detail: DERMATOLOGY
End: 2018-12-10

## 2018-12-10 PROBLEM — C44.329 SQUAMOUS CELL CARCINOMA OF SKIN OF OTHER PARTS OF FACE: Status: ACTIVE | Noted: 2018-12-10

## 2018-12-10 PROBLEM — C44.42 SQUAMOUS CELL CARCINOMA OF SKIN OF SCALP AND NECK: Status: ACTIVE | Noted: 2018-12-10

## 2018-12-10 PROCEDURE — OTHER SUPERFICIAL RADIATION TREATMENT: OTHER

## 2018-12-10 PROCEDURE — G6001 ECHO GUIDANCE RADIOTHERAPY: HCPCS

## 2018-12-10 PROCEDURE — OTHER FOLLOW UP FOR NEXT VISIT: OTHER

## 2018-12-10 PROCEDURE — OTHER TREATMENT REGIMEN: OTHER

## 2018-12-10 PROCEDURE — 77401 RADIATION TX DELIVERY SUPFC: CPT

## 2018-12-10 PROCEDURE — 77280 THER RAD SIMULAJ FIELD SMPL: CPT

## 2018-12-10 NOTE — PROCEDURE: TREATMENT REGIMEN
Detail Level: Zone
Modify Regimen: Patient was treated at [facility] for fractions 1-10, and will continue and complete treatment at the [facility].
Plan: This patient has been treated today with superficial radiation therapy for non-melanoma skin cancer.   \\n \\nWritten informed consent has been previously obtained from this patient for this treatment.  This consent is documented in the patient’s chart.  The patient gave verbal consent to continue treatment today.\\n \\nThe patient was treated with a specific radiation dose and setup as prescribed by the provider listed on this visit note.  A Radiation Therapist performed administration of radiation. The treatment parameters and cumulative dose are indicated above. \\n \\nPrior to administering the radiation, the patient underwent a verification simulation defining relevant normal and abnormal target anatomy and acquiring images and data necessary to develop optimal radiation treatment process for the patient. This process includes verification of the treatment port(s) and proper treatment positioning.  All treatment ports were photographed.  The patient’s customized lead blocking was confirmed.   Considering, superficial radiotherapy setup is a clinical setup, this requires physician and radiation therapist to clarify location interest being treated against initial images, pathology and patient anatomy. Care was taken ensuring fields treated were geometrically accurate and properly positioned using daily verification simulation.  This process is also utilized to determine if any changes are necessary.   These steps are therefore medically necessary ensuring safe and effective administration of radiation.\\n \\nA high frequency ultrasound image was acquired today for two-dimensional evaluation of the tumor volume and response to treatment, in addition to geometric accuracy of field placement. The daily field placement is separate and distinct from the initial simulation and is an important task in providing safe administration of radiation therapy. Physician evaluation of the ultrasound tumor depth will be ongoing throughout the course of treatment and is deemed medically necessary in order to ensure the efficacy of treatment. Today’s image was evaluated for determination of continuation of treatment with the current plan or with necessary changes as appropriate. Additionally, the use of visualization and targeted assessment allows the patient to be able to see their cancer(s) progress, encouraging patient to complete full course of radiotherapy. \\n \\nPer Dr. Childress, continued daily US guidance and clinical setup simulation is required for field placement, measurement of tumor depth, progress and acute effect monitoring.\\n\\nL. Central Forehead\\nUs depth is 0.0mm, Repop +++, MAXIMUS 0.0mm sq\\n\\nL. Medial Frontal Scalp\\nUS depth is 0.0mm, Repop +++, MAXIMUS 0.0mm sq\\n\\nMid- Occipital Scalp\\nUS depth is 0.44mm(edema), Repop +++, MAXIMUS equivocal due to edema

## 2018-12-10 NOTE — PROCEDURE: SUPERFICIAL RADIATION TREATMENT
Fractionation Number: 13
Port Dimensions-Y Axis In Cm: 2
Shielding Size (Optional- Include Units) Rx 2: 2.0 x 2.0 cm
Prescription Used: 1
Total Number Of Fractions Rx 4: 15
Include Rx 4 When Rendering Additional Prescriptions: No
Energy (Optional-Please Include Units): 50kV
Simple Simulation Afterword Text Will Be Included With Simple Simulations (Indications............): The patient had a complete consultation regarding all applicable modalities for the treatment of their skin cancer and based on a variety of factors including the type of tumor, size, and location, the relevant medical history as well as local tissue factors, the functional status of the individual, the ability to perform necessary postoperative wound instructions and the need for simultaneous treatments as well as overall wound healing status, it was determined that the patient would begin radiation therapy treatment for skin cancer.  A full simulation and treatment device design was performed including the determination and formulation of appropriate simple and complex devices including lead shield of 0.762 mm thickness to form molded customized shielding to specifically correlate with the lesion size including treatment margin.  The custom lead shield is adequate to accommodate the appropriate applicator and provide adequate shielding around the treatment site.  The specific field applicator, shields, and devices both simple and complex as well as the specific patient setup is outlined below.  The patient was given a full consent for superficial radiation to both verbally and in writing and the full determination of patient's eligibility for treatment and selection is outlined on the patient eligibility and treatment selection form.  The specific superficial radiotherapy prescription was determined and was documented on the superficial radiotherapy prescription form.  A treatment calculation was also performed and documented on the treatment calculation form.  Based on the prescription, the patient was scheduled for a series of fractional treatments.
Fractions / Week Rx 3: 5
Pathology Override (Pathology Will Render As Diagnosis Name If Left Blank): Superficially Invasive Squamous Cell Carcinoma
Dose Per Fractionation In Cgy (Optional): 378
Assessment: Appropriate reaction
Treatment Device Design After Initial Simulation Justification (Will Render If Bill For Treatment Devices = Yes): The patient is status post radiation simulation and is evaluated as to the use of additional devices for shielding and placement for radiation therapy.
Treatment Time / Fractionation (Optional- Include Units): 0.59Min
Custom Shielding Preamble Text Will Not Be Included With Simple Simulations (.......... X X Y Cm): A lead shield of 0.762 mm thickness is utilized to form a molded, custom shield with a
Bill For Radiation Treatment: Yes
Field Size (Applicator): 2.5 cm
Daily Fractionated Dose (Optional- Include Units): 381.14cGy
Treatment Margins In Cm: 0.5
Intro Statement (Will Not Render If Left Blank): The patient is undergoing superficial radiation therapy for skin cancer and presents for weekly evaluation and management.  Per protocol and as documented on the flow sheet, the patient was questioned as to subjective redness, pruritus, pain, drainage, fatigue, or any other symptoms.  Objectively, the radiation area was evaluated with regards to erythema, atrophy, scale, crusting, erosion, ulceration, edema, purpura, tenderness, warmth, drainage, and any other findings.  The plan was extensively reviewed including the dose, and dosing schedule.  The simulation and clinical setup was also reviewed as was the external and any internal shields and based on this review the appropriateness and sufficiency of treatment was determined.
Comments: On Target, RTOG 1
Energy (Optional-Please Include Units): 70kV
Computed Treatment Time In Min (Will Render The Same As Calculated Treatment Time If Left Blank): 0.54
Time Dose Fractionation (Optional- Include Units If Applicable): 38
Total Number Of Fractions Rx 2: 8
Patient Positioning: Sitting
Daily Fractionated Dose (Optional- Include Units) Rx 2: 383.5cGy
Detail Level: Detailed
Functional Status: 1 (ambulatory, light activity)
Total Dose (Optional-Please Include Units) Rx 2: 3068.0cGy
Day Of The Week Treatment Administered: Monday
Cumulative Dose In Cgy (Optional): 4957.2
Field Number: 3
Shielding Size (Optional- Include Units): 2.0cm x 2.0cm
Total Dose (Optional-Please Include Units): 1905.7cGy
Time Dose Fractionation (Optional- Include Units If Applicable) Rx 2: 61
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
Treatment Time / Fractionation (Optional- Include Units) Rx 2: 0.50min
Simple Simulation Preamble Text Will Be Included With Simple Simulations (.......... Indications): Simple simulation was performed today for the following reasons:
Custom Shielding Afterword Text Will Not Be Included With Simple Simulations (X X Y Cm............): port to correlate with the lesion size, including treatment margin. The custom lead shield is adequate to accommodate the appropriate applicator and provide adequate shielding around the treatment site. Additional shielding (as noted below) is used to protect sensitive, normal tissues.
Dose / Tx In Cgy (Optional): 375

## 2018-12-31 ENCOUNTER — APPOINTMENT (OUTPATIENT)
Age: 73
Setting detail: DERMATOLOGY
End: 2019-01-02

## 2018-12-31 PROBLEM — C44.329 SQUAMOUS CELL CARCINOMA OF SKIN OF OTHER PARTS OF FACE: Status: ACTIVE | Noted: 2018-12-31

## 2018-12-31 PROBLEM — C44.42 SQUAMOUS CELL CARCINOMA OF SKIN OF SCALP AND NECK: Status: ACTIVE | Noted: 2018-12-31

## 2018-12-31 PROBLEM — I10 ESSENTIAL (PRIMARY) HYPERTENSION: Status: ACTIVE | Noted: 2018-12-31

## 2018-12-31 PROCEDURE — G6001 ECHO GUIDANCE RADIOTHERAPY: HCPCS

## 2018-12-31 PROCEDURE — OTHER SUPERFICIAL RADIATION TREATMENT: OTHER

## 2018-12-31 PROCEDURE — 77427 RADIATION TX MANAGEMENT X5: CPT

## 2018-12-31 PROCEDURE — OTHER FOLLOW UP FOR NEXT VISIT: OTHER

## 2018-12-31 PROCEDURE — OTHER TREATMENT REGIMEN: OTHER

## 2018-12-31 NOTE — PROCEDURE: SUPERFICIAL RADIATION TREATMENT
Field Size (Applicator): 2.5 cm
Total Number Of Fractions: 5
Treatment Time In Min (Optional): 0.54
Prescription Used: 1
Energy (Optional-Please Include Units): 70kV
Fractions / Week Rx 2: 2
Custom Shielding Afterword Text Will Not Be Included With Simple Simulations (X X Y Cm............): port to correlate with the lesion size, including treatment margin. The custom lead shield is adequate to accommodate the appropriate applicator and provide adequate shielding around the treatment site. Additional shielding (as noted below) is used to protect sensitive, normal tissues.
Functional Status: 1 (ambulatory, light activity)
Patel For Simulation Without Treatment Device Design (Simple Simulation): No
Total Number Of Fractions Rx 2: 8
Time Dose Fractionation (Optional- Include Units If Applicable) Rx 2: 61
Dose / Tx In Cgy (Optional): 375
Treatment Margins In Cm: 0.5
Simple Simulation Afterword Text Will Be Included With Simple Simulations (Indications............): The patient had a complete consultation regarding all applicable modalities for the treatment of their skin cancer and based on a variety of factors including the type of tumor, size, and location, the relevant medical history as well as local tissue factors, the functional status of the individual, the ability to perform necessary postoperative wound instructions and the need for simultaneous treatments as well as overall wound healing status, it was determined that the patient would begin radiation therapy treatment for skin cancer.  A full simulation and treatment device design was performed including the determination and formulation of appropriate simple and complex devices including lead shield of 0.762 mm thickness to form molded customized shielding to specifically correlate with the lesion size including treatment margin.  The custom lead shield is adequate to accommodate the appropriate applicator and provide adequate shielding around the treatment site.  The specific field applicator, shields, and devices both simple and complex as well as the specific patient setup is outlined below.  The patient was given a full consent for superficial radiation to both verbally and in writing and the full determination of patient's eligibility for treatment and selection is outlined on the patient eligibility and treatment selection form.  The specific superficial radiotherapy prescription was determined and was documented on the superficial radiotherapy prescription form.  A treatment calculation was also performed and documented on the treatment calculation form.  Based on the prescription, the patient was scheduled for a series of fractional treatments.
Dose / Tx In Cgy (Optional): 378
Total Dose (Optional-Please Include Units): 1905.7cGy
Energy (Optional-Please Include Units) Rx 2: 50Kv
Time Dose Fractionation (Optional- Include Units If Applicable): 38
Fractionation Number: 13
Custom Shielding Preamble Text Will Not Be Included With Simple Simulations (.......... X X Y Cm): A lead shield of 0.762 mm thickness is utilized to form a molded, custom shield with a
Treatment Time / Fractionation (Optional- Include Units) Rx 2: 0.50min
Shielding Size (Optional- Include Units) Rx 2: 2.0 x 2.0 cm
Total Number Of Fractions Rx 3: 15
Shielding Size (Optional- Include Units): 2.0cm x 2.0cm
Bill For Radiation Treatment: Yes
Intro Statement (Will Not Render If Left Blank): The patient is undergoing superficial radiation therapy for skin cancer and presents for weekly evaluation and management.  Per protocol and as documented on the flow sheet, the patient was questioned as to subjective redness, pruritus, pain, drainage, fatigue, or any other symptoms.  Objectively, the radiation area was evaluated with regards to erythema, atrophy, scale, crusting, erosion, ulceration, edema, purpura, tenderness, warmth, drainage, and any other findings.  The plan was extensively reviewed including the dose, and dosing schedule.  The simulation and clinical setup was also reviewed as was the external and any internal shields and based on this review the appropriateness and sufficiency of treatment was determined.
Patient Positioning: Sitting
Daily Fractionated Dose (Optional- Include Units): 381.14cGy
Treatment Time / Fractionation (Optional- Include Units): 0.59Min
Pathology Override (Pathology Will Render As Diagnosis Name If Left Blank): Superficially Invasive Squamous Cell Carcinoma
Day Of The Week Treatment Administered: Monday
Total Dose (Optional-Please Include Units) Rx 2: 3068.0cGy
Assessment: Appropriate reaction
Treatment Device Design After Initial Simulation Justification (Will Render If Bill For Treatment Devices = Yes): The patient is status post radiation simulation and is evaluated as to the use of additional devices for shielding and placement for radiation therapy.
Comments: On Target, RTOG 1
Cumulative Dose In Cgy (Optional): 4957.2
Daily Fractionated Dose (Optional- Include Units) Rx 2: 383.5cGy
Simple Simulation Preamble Text Will Be Included With Simple Simulations (.......... Indications): Simple simulation was performed today for the following reasons:
Detail Level: Detailed
Field Number: 3
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

## 2018-12-31 NOTE — PROCEDURE: TREATMENT REGIMEN
Modify Regimen: Patient was treated at [facility] for fractions 1-10, and will continue and complete [facility] [place].
Plan: Written informed consent has been previously obtained from this patient for this treatment.  This consent is documented in the patient’s chart.  The patient gave verbal consent to continue treatment today.\\n  Per the request of Dr. Childress, Mr. Grove was seen today for Superficial Radiation Therapy management.  A high frequency ultrasound image was acquired prior to treatment today for three dimensional evaluation of tumor volume and response to treatment, in addition, geometric accuracy of field placement (CPT®  ). Physician evaluation of the ultrasound tumor depth is ongoing through treatment, and is deemed medically necessary ensuring efficacy of treatment.\\n2 weeks after finishing SRT, evaluation and management of SRT based on prescription and cumulative dose for reaction and response to radiation reveals adequate healing and response with pleasing aesthetics results.  No evidence of cancerous lesion on or around treatment site visible on ultrasound or dermoscopy. RTOG = 1\\n\\nL. Central Forehead\\nUs depth is 0.0mm, Repop +++, MAXIMUS 0.0mm sq\\n\\nL. Medial Frontal Scalp\\nUS depth is 0.0mm, Repop +++, MAXIMUS 0.0mm sq\\n\\nMid- Occipital Scalp\\nUS depth is 0.00mm(edema), Repop +++, MAXIMUS equivocal due to edema
Detail Level: Zone

## 2019-05-01 ENCOUNTER — APPOINTMENT (OUTPATIENT)
Age: 74
Setting detail: DERMATOLOGY
End: 2019-05-01

## 2019-05-01 DIAGNOSIS — F42.4 EXCORIATION (SKIN-PICKING) DISORDER: ICD-10-CM

## 2019-05-01 DIAGNOSIS — L57.0 ACTINIC KERATOSIS: ICD-10-CM

## 2019-05-01 DIAGNOSIS — B35.3 TINEA PEDIS: ICD-10-CM

## 2019-05-01 DIAGNOSIS — B35.1 TINEA UNGUIUM: ICD-10-CM

## 2019-05-01 DIAGNOSIS — D485 NEOPLASM OF UNCERTAIN BEHAVIOR OF SKIN: ICD-10-CM

## 2019-05-01 DIAGNOSIS — L57.8 OTHER SKIN CHANGES DUE TO CHRONIC EXPOSURE TO NONIONIZING RADIATION: ICD-10-CM

## 2019-05-01 PROBLEM — D48.5 NEOPLASM OF UNCERTAIN BEHAVIOR OF SKIN: Status: ACTIVE | Noted: 2019-05-01

## 2019-05-01 PROBLEM — I10 ESSENTIAL (PRIMARY) HYPERTENSION: Status: ACTIVE | Noted: 2019-05-01

## 2019-05-01 PROBLEM — S20.309A UNSPECIFIED SUPERFICIAL INJURIES OF UNSPECIFIED FRONT WALL OF THORAX, INITIAL ENCOUNTER: Status: ACTIVE | Noted: 2019-05-01

## 2019-05-01 PROCEDURE — OTHER OBSERVATION: OTHER

## 2019-05-01 PROCEDURE — 17004 DESTROY PREMAL LESIONS 15/>: CPT

## 2019-05-01 PROCEDURE — 99214 OFFICE O/P EST MOD 30 MIN: CPT | Mod: 25

## 2019-05-01 PROCEDURE — OTHER RECOMMENDATIONS: OTHER

## 2019-05-01 PROCEDURE — OTHER MIPS QUALITY: OTHER

## 2019-05-01 PROCEDURE — OTHER LIQUID NITROGEN: OTHER

## 2019-05-01 PROCEDURE — OTHER COUNSELING: OTHER

## 2019-05-01 PROCEDURE — OTHER PRESCRIPTION: OTHER

## 2019-05-01 PROCEDURE — OTHER FOLLOW UP FOR NEXT VISIT: OTHER

## 2019-05-01 PROCEDURE — OTHER TREATMENT REGIMEN: OTHER

## 2019-05-01 RX ORDER — KETOCONAZOLE 20 MG/G
CREAM TOPICAL
Qty: 1 | Refills: 2 | Status: ERX | COMMUNITY
Start: 2019-05-01

## 2019-05-01 ASSESSMENT — LOCATION DETAILED DESCRIPTION DERM
LOCATION DETAILED: STERNUM
LOCATION DETAILED: LEFT SUPERIOR FOREHEAD
LOCATION DETAILED: RIGHT INFERIOR FOREHEAD
LOCATION DETAILED: LEFT SUPERIOR PARIETAL SCALP
LOCATION DETAILED: RIGHT SUPERIOR PARIETAL SCALP
LOCATION DETAILED: LEFT LATERAL FOREHEAD
LOCATION DETAILED: LEFT MEDIAL PLANTAR MIDFOOT
LOCATION DETAILED: LEFT MEDIAL FRONTAL SCALP
LOCATION DETAILED: LEFT MEDIAL FOREHEAD
LOCATION DETAILED: LEFT SUPERIOR OCCIPITAL SCALP
LOCATION DETAILED: RIGHT INFERIOR MEDIAL FOREHEAD
LOCATION DETAILED: RIGHT GREAT TOENAIL
LOCATION DETAILED: RIGHT SUPERIOR OCCIPITAL SCALP
LOCATION DETAILED: RIGHT MEDIAL FRONTAL SCALP
LOCATION DETAILED: RIGHT CENTRAL PARIETAL SCALP
LOCATION DETAILED: RIGHT SUPERIOR LATERAL NECK
LOCATION DETAILED: RIGHT SUPERIOR POSTERIOR PARIETAL SCALP
LOCATION DETAILED: LEFT LATERAL GREAT TOE
LOCATION DETAILED: RIGHT FOREHEAD
LOCATION DETAILED: RIGHT OCCIPITAL SCALP
LOCATION DETAILED: RIGHT ANTERIOR PROXIMAL THIGH
LOCATION DETAILED: LEFT SUPERIOR CRUS OF ANTIHELIX
LOCATION DETAILED: RIGHT MEDIAL PLANTAR MIDFOOT
LOCATION DETAILED: LEFT POSTERIOR PARIETAL SCALP
LOCATION DETAILED: LEFT CENTRAL PARIETAL SCALP
LOCATION DETAILED: RIGHT CENTRAL FRONTAL SCALP
LOCATION DETAILED: LEFT OCCIPITAL SCALP
LOCATION DETAILED: RIGHT SUPERIOR MEDIAL FOREHEAD

## 2019-05-01 ASSESSMENT — LOCATION SIMPLE DESCRIPTION DERM
LOCATION SIMPLE: RIGHT THIGH
LOCATION SIMPLE: SCALP
LOCATION SIMPLE: LEFT SCALP
LOCATION SIMPLE: RIGHT GREAT TOE
LOCATION SIMPLE: RIGHT FOREHEAD
LOCATION SIMPLE: LEFT PLANTAR SURFACE
LOCATION SIMPLE: NECK
LOCATION SIMPLE: RIGHT PLANTAR SURFACE
LOCATION SIMPLE: RIGHT SCALP
LOCATION SIMPLE: POSTERIOR SCALP
LOCATION SIMPLE: LEFT FOREHEAD
LOCATION SIMPLE: LEFT EAR
LOCATION SIMPLE: LEFT GREAT TOE
LOCATION SIMPLE: RIGHT FOREHEAD
LOCATION SIMPLE: CHEST
LOCATION SIMPLE: LEFT OCCIPITAL SCALP

## 2019-05-01 ASSESSMENT — LOCATION ZONE DERM
LOCATION ZONE: FACE
LOCATION ZONE: FACE
LOCATION ZONE: LEG
LOCATION ZONE: NECK
LOCATION ZONE: SCALP
LOCATION ZONE: FEET
LOCATION ZONE: TRUNK
LOCATION ZONE: TOE
LOCATION ZONE: TOENAIL
LOCATION ZONE: EAR

## 2019-05-01 NOTE — PROCEDURE: TREATMENT REGIMEN
Initiate Treatment: Start Ketoconazole 2% cream to feet b.i.d. As directed. Start Tolnaftate spray. Spray feet, toes, and shoes QHS, cover with socks overnight. (Or for at least an hour)
Detail Level: Zone
Initiate Treatment: Start Ketoconazole cream 2% b.i.d. As directed.

## 2019-05-01 NOTE — PROCEDURE: RECOMMENDATIONS
Detail Level: Zone
Recommendation Preamble: The following recommendations were made during the visit: sun protection discussed, and sunscreen use discussed

## 2019-05-01 NOTE — PROCEDURE: OBSERVATION
Size Of Lesion In Cm (Optional): 0.3
Detail Level: Detailed
Size Of Lesion In Cm (Optional): 0
X Size Of Lesion In Cm (Optional): 0.6

## 2019-05-01 NOTE — PROCEDURE: LIQUID NITROGEN
Duration Of Freeze Thaw-Cycle (Seconds): 0
Detail Level: Detailed
Consent: The patient's consent was obtained including but not limited to risks of crusting, scabbing, blistering, scarring, darker or lighter pigmentary change, recurrence, incomplete removal and infection.
Render Note In Bullet Format When Appropriate: No
Number Of Freeze-Thaw Cycles: 2 freeze-thaw cycles
Post-Care Instructions: I reviewed with the patient in detail post-care instructions. Patient is to wear sunprotection, and avoid picking at any of the treated lesions. Pt may apply Vaseline to crusted or scabbing areas.

## 2020-04-03 ENCOUNTER — APPOINTMENT (OUTPATIENT)
Age: 75
Setting detail: DERMATOLOGY
End: 2020-04-04

## 2020-04-03 DIAGNOSIS — Z85.828 PERSONAL HISTORY OF OTHER MALIGNANT NEOPLASM OF SKIN: ICD-10-CM

## 2020-04-03 DIAGNOSIS — D485 NEOPLASM OF UNCERTAIN BEHAVIOR OF SKIN: ICD-10-CM

## 2020-04-03 PROBLEM — I10 ESSENTIAL (PRIMARY) HYPERTENSION: Status: ACTIVE | Noted: 2020-04-03

## 2020-04-03 PROBLEM — D48.5 NEOPLASM OF UNCERTAIN BEHAVIOR OF SKIN: Status: ACTIVE | Noted: 2020-04-03

## 2020-04-03 PROCEDURE — OTHER TELEHEALTH ASSESSMENT: OTHER

## 2020-04-03 PROCEDURE — OTHER REASON FOR TELEMEDICINE VISIT: OTHER

## 2020-04-03 PROCEDURE — OTHER TELEHEALTH RECOMMENDATIONS: OTHER

## 2020-04-03 PROCEDURE — OTHER COUNSELING: OTHER

## 2020-04-03 PROCEDURE — OTHER MIPS QUALITY: OTHER

## 2020-04-03 PROCEDURE — 99213 OFFICE O/P EST LOW 20 MIN: CPT | Mod: 95

## 2020-04-03 NOTE — PROCEDURE: TELEHEALTH RECOMMENDATIONS
Recommendations (Free Text): For pt to come so we can view it close in case a biopsy is need \\nWill come in next week for further evaluation
Recommendation Preamble: I recommend the following:
Detail Level: Simple

## 2020-04-03 NOTE — PROCEDURE: TELEHEALTH ASSESSMENT
Assessment (Free Text): Pt states spots are painful , feels like a pins on head and sensitive\\nPt sent in photos for the spot he is concerned about
Recommendation Preamble: Assessment:
Detail Level: Simple

## 2020-04-06 ENCOUNTER — APPOINTMENT (OUTPATIENT)
Age: 75
Setting detail: DERMATOLOGY
End: 2020-04-06

## 2020-04-06 DIAGNOSIS — Z85.828 PERSONAL HISTORY OF OTHER MALIGNANT NEOPLASM OF SKIN: ICD-10-CM

## 2020-04-06 DIAGNOSIS — D485 NEOPLASM OF UNCERTAIN BEHAVIOR OF SKIN: ICD-10-CM

## 2020-04-06 PROBLEM — E78.5 HYPERLIPIDEMIA, UNSPECIFIED: Status: ACTIVE | Noted: 2020-04-06

## 2020-04-06 PROBLEM — D48.5 NEOPLASM OF UNCERTAIN BEHAVIOR OF SKIN: Status: ACTIVE | Noted: 2020-04-06

## 2020-04-06 PROBLEM — M12.9 ARTHROPATHY, UNSPECIFIED: Status: ACTIVE | Noted: 2020-04-06

## 2020-04-06 PROCEDURE — OTHER COUNSELING: OTHER

## 2020-04-06 PROCEDURE — OTHER OBSERVATION: OTHER

## 2020-04-06 PROCEDURE — OTHER MIPS QUALITY: OTHER

## 2020-04-06 PROCEDURE — OTHER DEFER: OTHER

## 2020-04-06 PROCEDURE — 99214 OFFICE O/P EST MOD 30 MIN: CPT

## 2020-04-06 ASSESSMENT — LOCATION DETAILED DESCRIPTION DERM
LOCATION DETAILED: POSTERIOR MID-PARIETAL SCALP
LOCATION DETAILED: RIGHT CENTRAL FRONTAL SCALP
LOCATION DETAILED: RIGHT MEDIAL FRONTAL SCALP
LOCATION DETAILED: LEFT CENTRAL FRONTAL SCALP
LOCATION DETAILED: LEFT SUPERIOR PARIETAL SCALP

## 2020-04-06 ASSESSMENT — LOCATION SIMPLE DESCRIPTION DERM
LOCATION SIMPLE: SCALP
LOCATION SIMPLE: LEFT SCALP
LOCATION SIMPLE: RIGHT SCALP
LOCATION SIMPLE: POSTERIOR SCALP

## 2020-04-06 ASSESSMENT — LOCATION ZONE DERM: LOCATION ZONE: SCALP

## 2020-04-06 NOTE — PROCEDURE: DEFER
Detail Level: Detailed
Reason To Defer Override: due to covid 19
Introduction Text (Please End With A Colon): The following procedure was deferred:
Instructions (Optional): Once able will schedule appointment